# Patient Record
Sex: FEMALE | Race: WHITE | NOT HISPANIC OR LATINO | Employment: FULL TIME | URBAN - METROPOLITAN AREA
[De-identification: names, ages, dates, MRNs, and addresses within clinical notes are randomized per-mention and may not be internally consistent; named-entity substitution may affect disease eponyms.]

---

## 2017-01-03 ENCOUNTER — OFFICE VISIT (OUTPATIENT)
Dept: ORTHOPEDIC SURGERY | Facility: CLINIC | Age: 27
End: 2017-01-03

## 2017-01-03 VITALS — BODY MASS INDEX: 25.1 KG/M2 | WEIGHT: 147 LBS | HEIGHT: 64 IN | TEMPERATURE: 98.2 F

## 2017-01-03 DIAGNOSIS — Z98.890 H/O ARTHROSCOPY OF SHOULDER: Primary | ICD-10-CM

## 2017-01-03 PROCEDURE — 99024 POSTOP FOLLOW-UP VISIT: CPT | Performed by: ORTHOPAEDIC SURGERY

## 2017-01-03 RX ORDER — CYCLOBENZAPRINE HCL 10 MG
10 TABLET ORAL 2 TIMES DAILY PRN
Qty: 30 TABLET | Refills: 0 | Status: SHIPPED | OUTPATIENT
Start: 2017-01-03 | End: 2017-03-08

## 2017-01-03 NOTE — PATIENT INSTRUCTIONS
DENISE Cuellar MD  Capsular Plication for Multidirectional Instability        General Notes:    Please be aware that all timeframes and exercises described below are designed to serve as guidelines only.  While appropriate for the majority of patients, on occasion the therapy and progression to have to be tailored to meet certain individual needs.  Progression through the therapy protocol will be based upon the criteria described rather than adherence to a strict timeframe.  Progress will be agreed upon by the patient, therapist and physician to ensure that we are all in accordance.  Return to sport will be based upon your progress in physical therapy as well as appropriate testing as described below.    Remember to ice 15-20 minutes after each therapy session.  All range of motion and strengthening exercises should be performed as tolerated.  Pain and/or increased swelling are signs that you are doing too much too soon.  If these occur, please decrease your activity level and ice.    Passive means someone will do this for you.  Active assist means you can use your other arm to perform the exercise.  Active means you can use the arm that was operated on to do this exercise.    ===================================================================    Post-operative Phase 1:  Weeks 0-2    Goals:    1.  Ensure wound healing  2.  Prevent shoulder stiffness  3.  Regain early range of motion  4.  Decrease pain    Brace:      Wear sling/pillow at all times except when showering or performing pendulum exercises.  You should remove the sling and perform pendulum exercises 5 or 6 times per day.        Page two    Weight Bearing (WB) status:      No lifting of objects, excessive shoulder motion, excessive stretching or sudden movements.  No supporting body weight with the hands.      Range of Motion (ROM):      NO ACTIVE ROM of shoulder.  Elbow and wrist ROM with shoulder in neutral position at the side only.  Perform  shoulder pendulum exercises 5 to 6 times daily.  Do NOT raise your arm above your head, reach across your body or reach your hand behind you as if to tuck in your shirt or loop your belt.  No internal rotation or horizontal adduction.    Therapy Exercises:      Full finger, wrist and elbow motion.  Shoulder pendulum exercises.  Ball squeeze exercises.     Modalities:      Icing, manual massage, galvanic stimulation for pain control.    Cardio:      Stationary bike.    Criteria for Progression to Phase 2:      Pain controlled.  Wounds healing well.      ===================================================================    Post-operative Phase 2:  Weeks 3-5    Goals:  1.  Protect the repair  2.  Prevent shoulder stiffness  3.  Improve range of motion    Brace:      Continue to use the sling/pillow for the first 6 weeks.  You may remove the sling/pillow for your exercises and showering only.    Weight Bearing (WB) status:      No lifting of objects heavier than a coffee cup, excessive stretching or sudden movements.  No supporting body weight with the hands.      Page three    Range of Motion (ROM):    NO ACTIVE ROM.  Advance passive and active assist forward flexion in scapular plane to 90 degrees.  Advance to 120 degrees FE after week 4.  Supine passive and active assist external rotation to 30 degrees only.  No internal rotation.  No cross body adduction.  Do NOT reach across your body or reach your hand behind you as if to tuck in your shirt or loop your belt.      Therapy Exercises:      As above. NO aggressive passive ROM, stretching or manipulation.  Supine active assist arm elevation to 90 degrees thru week 4 then 120 degrees.  Supine active assist ER to 30 degrees only.  Periscapular isometrics.  Submaximal, subpainful deltoid and rotator cuff isometrics in neutral.  Shoulder shrugs.  Scapular retraction.  Prone rows, prone extensions, and biceps curls after week 5.    Modalities:      Icing, manual massage,  galvanic stimulation for pain control.    Cardio:      Stationary bike.  Treadmill walking, elliptical machine and stair stepper after week 4.    Criteria for Progression to Phase :      Pain controlled.  Wounds healed.  Should have 120 degrees of forward elevation and 30 degrees of external rotation.     Post-operative Phase 3:  Weeks 6-12    Goals:  1.  Protect the repair to prevent overstretching  2.  Improve range of motion  3.  Begin gentle strengthening  4.  Increase functional activities      Brace:      Sling/pillow is no longer necessary unless you are instructed otherwise.  Continue to avoid lifting your arm away from your body, having the arm actively pulled behind you, or across your chest in front of your body, since this puts the repair at risk.      Weight Bearing (WB) status:      No excessive shoulder extension, excessive stretching or sudden movements.  No supporting body weight with the hands.      Page Four    Range of Motion (ROM):      Advance active assist and active forward elevation and external rotation to tolerance.  Can start behind the back internal rotation and horizontal adduction stretching AFTER week 8.      Therapy Exercises:      Continue all exercises listed above.  Advance ROM as described above.  Initiate standing external rotation, seated-standing forward elevation.  Start therabands, prone horizontal arm raises, prone scaption, prone rows, prone extension, standing scaption.  After week 8, may begin wall slides, supine cross chest stretch, side lying external rotation.  UBE forwards and backwards at low resistance after week 8.      Modalities:      Only as needed.  Continue to ice after therapy.    Cardio:      Stationary bike, elliptical machine, stair stepper.  May begin treadmill running progression after week 8.    Criteria for Progression to Phase 4:      Full non-painful range of  motion.    ===================================================================    Post-operative Phase 4:  Weeks 13-18    Goals:  1.  Protect the repair  2.  Continue gentle strengthening  3.  Add functional activities  4.  Improve neuromuscular control    Brace:  None    Weight Bearing (WB) status:      Continue to avoid heavy weight lifting or manual labor.  No forceful pushing or pulling.  NO push ups.  No supporting your body weight with your hands.  Be cautious reaching behind or across your body.          Page five    Range of Motion (ROM):    Continue full motion with light stretching at extremes.    Therapy Exercises:      Continue previous exercises as necessary.  Start sidelying internal rotation (sleeper) stretch and external rotation at 90 degrees of abduction stretching.  Advance strengthening as tolerated with gradual progression from isometrics to bands to light weights.  Start with 1 lb. and progress slowly.  Only perform strengthening exercises 3 x per week to avoid tendonitis.  May gradually begin eccentrically resisted motion, plyometrics and proprioception exercises.    Modalities:      Icing after therapy, as necessary.    Cardio:      Continue running progression program.     Criteria for Progression to Phase 5:      Full painless ROM, strength steadily improving.    ===================================================================    Post-operative Phase 5:  3 months - 6 months    Goals:  1.  Continue to protect repair by avoiding excessively heavy weights or excessive force  2.  Restore full strength  3.  Gradual return to full function and sport    Brace:  None                            Page six    Weight Bearing (WB) status:      Overhead and throwing sports place significant stress on the labrum.  Your therapist and I will determine when you can return to sports.  Returning to sports requires that you have full motion, full strength, and no pain.      You may return to heavier weight  lifting after 3 to 4 months.  You should have full motion and normal strength in your rotator cuff and scapular muscles.  Your therapist and I will test your strength and give you clearance before you start weight training.    I prefer that you start out with high repetitions (15 to 20 reps) of low weights and gradually build up.  You may progress back into weight lifting exercises including shoulder press, bench press, pull downs, and push ups very gradually over a period of 3 to 6 months. You may start a push up progression program at 4 months, beginning with wall push ups.      Typical return to throwing is around 6 months and from the mound at 9 months.     Range of Motion (ROM):    Full    Therapy Exercises:      Continue to advance strengthening, motion, and progress to advanced conditioning and sports related rehab.  May progress weight bearing to include overhead wall dribbles, rebounder throwing, and deceleration drills with weighted ball after week 18.    Modalities:      As needed.    Cardio:      As tolerated.    **NO CONTACT SPORTS UNTIL 9 TO 12 MONTHS POST-OP**

## 2017-01-03 NOTE — MR AVS SNAPSHOT
Violeta Vargas   1/3/2017 10:30 AM   Office Visit    Dept Phone:  [phone]   Encounter #:  [id number]    Provider:  Justice Cuellar MD   Department:  [facility]                Your Full Care Plan              Today's Medication Changes          These changes are accurate as of: 1/3/17 12:18 PM.  If you have any questions, ask your nurse or doctor.               Medication(s)that have changed:     * cyclobenzaprine 10 MG tablet   Commonly known as:  FLEXERIL   Take 1 tablet by mouth 3 (Three) Times a Day As Needed for muscle spasms.   What changed:  Another medication with the same name was added. Make sure you understand how and when to take each.   Changed by:  Justice Cuellar MD       * cyclobenzaprine 10 MG tablet   Commonly known as:  FLEXERIL   Take 1 tablet by mouth 2 (Two) Times a Day As Needed for muscle spasms.   What changed:  You were already taking a medication with the same name, and this prescription was added. Make sure you understand how and when to take each.   Changed by:  Justice Cuellar MD       * Notice:  This list has 2 medication(s) that are the same as other medications prescribed for you. Read the directions carefully, and ask your doctor or other care provider to review them with you.         Where to Get Your Medications      These medications were sent to [address] - [phone] [facility] [phone] [place] [address]     Phone:  [phone]     cyclobenzaprine 10 MG tablet                  Your Updated Medication List          This list is accurate as of: 1/3/17 12:18 PM.  Always use your most recent med list.                ALBUTEROL IN       Cetirizine HCl 10 MG capsule       clobetasol 0.05 % external solution   Commonly known as:  TEMOVATE       clonazePAM 0.5 MG tablet   Commonly known as:   KlonoPIN       * cyclobenzaprine 10 MG tablet   Commonly known as:  FLEXERIL   Take 1 tablet by mouth 3 (Three) Times a Day As Needed for muscle spasms.       * cyclobenzaprine 10 MG tablet   Commonly known as:  FLEXERIL   Take 1 tablet by mouth 2 (Two) Times a Day As Needed for muscle spasms.       fluconazole 150 MG tablet   Commonly known as:  DIFLUCAN   Take 1 tablet by mouth Daily.       HYDROcodone-acetaminophen 5-325 MG per tablet   Commonly known as:  NORCO   Take 1 tablet by mouth Every 4 (Four) Hours As Needed for moderate pain (4-6).       hyoscyamine 0.125 MG tablet   Commonly known as:  ANASPAZ,LEVSIN   With each meal and at bedtime.       JUNEL 1/20 1-20 MG-MCG per tablet   Generic drug:  norethindrone-ethinyl estradiol   Take 1 tablet by mouth Daily.       ketoconazole 2 % shampoo   Commonly known as:  NIZORAL       lactulose 10 GM/15ML solution   Commonly known as:  CHRONULAC   Take 15 mL by mouth 3 (Three) Times a Day.       lithium 300 MG tablet       nitrofurantoin (macrocrystal-monohydrate) 100 MG capsule   Commonly known as:  MACROBID       ondansetron 4 MG tablet   Commonly known as:  ZOFRAN   Take 1 tablet by mouth Every 8 (Eight) Hours As Needed for nausea or vomiting.       senna 8.6 MG tablet   Commonly known as:  SENOKOT   Take 1 tablet by mouth Daily.       vitamin B-12 1000 MCG tablet   Commonly known as:  CYANOCOBALAMIN       * Notice:  This list has 2 medication(s) that are the same as other medications prescribed for you. Read the directions carefully, and ask your doctor or other care provider to review them with you.            We Performed the Following     Ambulatory Referral to Physical Therapy Evaluate and treat       You Were Diagnosed With        Codes Comments    H/O arthroscopy of shoulder    -  Primary ICD-10-CM: Z98.890  ICD-9-CM: V45.89       Instructions          S. Sancho Cuellar MD  Capsular Plication for Multidirectional Instability        General Notes:    Please  be aware that all timeframes and exercises described below are designed to serve as guidelines only.  While appropriate for the majority of patients, on occasion the therapy and progression to have to be tailored to meet certain individual needs.  Progression through the therapy protocol will be based upon the criteria described rather than adherence to a strict timeframe.  Progress will be agreed upon by the patient, therapist and physician to ensure that we are all in accordance.  Return to sport will be based upon your progress in physical therapy as well as appropriate testing as described below.    Remember to ice 15-20 minutes after each therapy session.  All range of motion and strengthening exercises should be performed as tolerated.  Pain and/or increased swelling are signs that you are doing too much too soon.  If these occur, please decrease your activity level and ice.    Passive means someone will do this for you.  Active assist means you can use your other arm to perform the exercise.  Active means you can use the arm that was operated on to do this exercise.    ===================================================================    Post-operative Phase 1:  Weeks 0-2    Goals:    1.  Ensure wound healing  2.  Prevent shoulder stiffness  3.  Regain early range of motion  4.  Decrease pain    Brace:      Wear sling/pillow at all times except when showering or performing pendulum exercises.  You should remove the sling and perform pendulum exercises 5 or 6 times per day.        Page two    Weight Bearing (WB) status:      No lifting of objects, excessive shoulder motion, excessive stretching or sudden movements.  No supporting body weight with the hands.      Range of Motion (ROM):      NO ACTIVE ROM of shoulder.  Elbow and wrist ROM with shoulder in neutral position at the side only.  Perform shoulder pendulum exercises 5 to 6 times daily.  Do NOT raise your arm above your head, reach across your body or  reach your hand behind you as if to tuck in your shirt or loop your belt.  No internal rotation or horizontal adduction.    Therapy Exercises:      Full finger, wrist and elbow motion.  Shoulder pendulum exercises.  Ball squeeze exercises.     Modalities:      Icing, manual massage, galvanic stimulation for pain control.    Cardio:      Stationary bike.    Criteria for Progression to Phase 2:      Pain controlled.  Wounds healing well.      ===================================================================    Post-operative Phase 2:  Weeks 3-5    Goals:  1.  Protect the repair  2.  Prevent shoulder stiffness  3.  Improve range of motion    Brace:      Continue to use the sling/pillow for the first 6 weeks.  You may remove the sling/pillow for your exercises and showering only.    Weight Bearing (WB) status:      No lifting of objects heavier than a coffee cup, excessive stretching or sudden movements.  No supporting body weight with the hands.      Page three    Range of Motion (ROM):    NO ACTIVE ROM.  Advance passive and active assist forward flexion in scapular plane to 90 degrees.  Advance to 120 degrees FE after week 4.  Supine passive and active assist external rotation to 30 degrees only.  No internal rotation.  No cross body adduction.  Do NOT reach across your body or reach your hand behind you as if to tuck in your shirt or loop your belt.      Therapy Exercises:      As above. NO aggressive passive ROM, stretching or manipulation.  Supine active assist arm elevation to 90 degrees thru week 4 then 120 degrees.  Supine active assist ER to 30 degrees only.  Periscapular isometrics.  Submaximal, subpainful deltoid and rotator cuff isometrics in neutral.  Shoulder shrugs.  Scapular retraction.  Prone rows, prone extensions, and biceps curls after week 5.    Modalities:      Icing, manual massage, galvanic stimulation for pain control.    Cardio:      Stationary bike.  Treadmill walking, elliptical machine and  stair stepper after week 4.    Criteria for Progression to Phase :      Pain controlled.  Wounds healed.  Should have 120 degrees of forward elevation and 30 degrees of external rotation.     Post-operative Phase 3:  Weeks 6-12    Goals:  1.  Protect the repair to prevent overstretching  2.  Improve range of motion  3.  Begin gentle strengthening  4.  Increase functional activities      Brace:      Sling/pillow is no longer necessary unless you are instructed otherwise.  Continue to avoid lifting your arm away from your body, having the arm actively pulled behind you, or across your chest in front of your body, since this puts the repair at risk.      Weight Bearing (WB) status:      No excessive shoulder extension, excessive stretching or sudden movements.  No supporting body weight with the hands.      Page Four    Range of Motion (ROM):      Advance active assist and active forward elevation and external rotation to tolerance.  Can start behind the back internal rotation and horizontal adduction stretching AFTER week 8.      Therapy Exercises:      Continue all exercises listed above.  Advance ROM as described above.  Initiate standing external rotation, seated-standing forward elevation.  Start therabands, prone horizontal arm raises, prone scaption, prone rows, prone extension, standing scaption.  After week 8, may begin wall slides, supine cross chest stretch, side lying external rotation.  UBE forwards and backwards at low resistance after week 8.      Modalities:      Only as needed.  Continue to ice after therapy.    Cardio:      Stationary bike, elliptical machine, stair stepper.  May begin treadmill running progression after week 8.    Criteria for Progression to Phase 4:      Full non-painful range of motion.    ===================================================================    Post-operative Phase 4:  Weeks 13-18    Goals:  1.  Protect the repair  2.  Continue gentle strengthening  3.  Add  functional activities  4.  Improve neuromuscular control    Brace:  None    Weight Bearing (WB) status:      Continue to avoid heavy weight lifting or manual labor.  No forceful pushing or pulling.  NO push ups.  No supporting your body weight with your hands.  Be cautious reaching behind or across your body.          Page five    Range of Motion (ROM):    Continue full motion with light stretching at extremes.    Therapy Exercises:      Continue previous exercises as necessary.  Start sidelying internal rotation (sleeper) stretch and external rotation at 90 degrees of abduction stretching.  Advance strengthening as tolerated with gradual progression from isometrics to bands to light weights.  Start with 1 lb. and progress slowly.  Only perform strengthening exercises 3 x per week to avoid tendonitis.  May gradually begin eccentrically resisted motion, plyometrics and proprioception exercises.    Modalities:      Icing after therapy, as necessary.    Cardio:      Continue running progression program.     Criteria for Progression to Phase 5:      Full painless ROM, strength steadily improving.    ===================================================================    Post-operative Phase 5:  3 months - 6 months    Goals:  1.  Continue to protect repair by avoiding excessively heavy weights or excessive force  2.  Restore full strength  3.  Gradual return to full function and sport    Brace:  None                            Page six    Weight Bearing (WB) status:      Overhead and throwing sports place significant stress on the labrum.  Your therapist and I will determine when you can return to sports.  Returning to sports requires that you have full motion, full strength, and no pain.      You may return to heavier weight lifting after 3 to 4 months.  You should have full motion and normal strength in your rotator cuff and scapular muscles.  Your therapist and I will test your strength and give you clearance before  you start weight training.    I prefer that you start out with high repetitions (15 to 20 reps) of low weights and gradually build up.  You may progress back into weight lifting exercises including shoulder press, bench press, pull downs, and push ups very gradually over a period of 3 to 6 months. You may start a push up progression program at 4 months, beginning with wall push ups.      Typical return to throwing is around 6 months and from the mound at 9 months.     Range of Motion (ROM):    Full    Therapy Exercises:      Continue to advance strengthening, motion, and progress to advanced conditioning and sports related rehab.  May progress weight bearing to include overhead wall dribbles, rebounder throwing, and deceleration drills with weighted ball after week 18.    Modalities:      As needed.    Cardio:      As tolerated.    **NO CONTACT SPORTS UNTIL 9 TO 12 MONTHS POST-OP**     Patient Instructions History      Upcoming Appointments     Visit Type Date Time Department    POST-OP 1/3/2017 10:30 AM [facility]    FOLLOW UP 1/27/2017  3:45 PM [facility]    OFFICE VISIT 3/8/2017 10:00 AM [facility]      SearchMe Signup     Our records indicate that you have an active [employer] account.    You can view your After Visit Summary by going to FindThatCourse and logging in with your SearchMe username and password.  If you don't have a SearchMe username and password but a parent or guardian has access to your record, the parent or guardian should login with their own SearchMe username and password and access your record to view the After Visit Summary.    If you have questions, you can email [email or web address] or call [phone] to talk to our SearchMe staff.  Remember, SearchMe is NOT to be used for urgent needs.  For medical emergencies, dial 911.               Other Info from Your Visit           Your Appointments     Jan 27, 2017  3:45 PM EST   Follow Up with Justice COBOS  "MD Alisa   [facility] (--)    [address]  [place] [address] [phone]           Arrive 15 minutes prior to appointment.            Mar 08, 2017 10:00 AM EST   Office Visit with Alexsander Pierson Jr., MD   [facility] (--)    [address]  [place] [address] [phone]           Arrive 15 minutes prior to appointment.              Allergies     Lamotrigine        Reason for Visit     Left Shoulder - Post-op Follow-up           Vital Signs     Temperature Height Weight Body Mass Index Smoking Status       98.2 °F (36.8 °C) 64\" (162.6 cm) 147 lb (66.7 kg) 25.23 kg/m2 Never Smoker       Problems and Diagnoses Noted     History of arthroscopy of shoulder    -  Primary        "

## 2017-01-03 NOTE — PROGRESS NOTES
Violeta Vargas : 1990 MRN: [id number] DATE: 1/3/2017    CC: 1 week s/p left shoulder capsular plication    HPI: Pt. returns to clinic today stating pain has been gradually improving.  Denies any wound problems including redness, drainage.  No fevers, chills, sweats.  Has been doing the pendulum exercises as instructed.  Reports compliance with use of the sling.      Vitals:    17 1135   Temp: 98.2 °F (36.8 °C)         Current Outpatient Prescriptions:   •  ALBUTEROL IN, Inhale 2 puffs every 1 (one) hour as needed., Disp: , Rfl:   •  Cetirizine HCl 10 MG capsule, Take 10 mg by mouth daily., Disp: , Rfl:   •  clobetasol (TEMOVATE) 0.05 % external solution, , Disp: , Rfl:   •  clonazePAM (KlonoPIN) 0.5 MG tablet, Take 0.5 mg by mouth 2 (two) times a day as needed for seizures., Disp: , Rfl:   •  cyclobenzaprine (FLEXERIL) 10 MG tablet, Take 1 tablet by mouth 3 (Three) Times a Day As Needed for muscle spasms., Disp: 90 tablet, Rfl: 0  •  HYDROcodone-acetaminophen (NORCO) 5-325 MG per tablet, Take 1 tablet by mouth Every 4 (Four) Hours As Needed for moderate pain (4-6)., Disp: 50 tablet, Rfl: 0  •  hyoscyamine (ANASPAZ,LEVSIN) 0.125 MG tablet, With each meal and at bedtime., Disp: 120 tablet, Rfl: 11  •  JUNEL  1-20 MG-MCG per tablet, Take 1 tablet by mouth Daily., Disp: 21 tablet, Rfl: 12  •  ketoconazole (NIZORAL) 2 % shampoo, , Disp: , Rfl:   •  lactulose (CHRONULAC) 10 GM/15ML solution, Take 15 mL by mouth 3 (Three) Times a Day., Disp: 473 mL, Rfl: 5  •  nitrofurantoin, macrocrystal-monohydrate, (MACROBID) 100 MG capsule, , Disp: , Rfl:   •  ondansetron (ZOFRAN) 4 MG tablet, Take 1 tablet by mouth Every 8 (Eight) Hours As Needed for nausea or vomiting., Disp: 30 tablet, Rfl: 0  •  senna (SENOKOT) 8.6 MG tablet, Take 1 tablet by mouth Daily., Disp: 30 tablet, Rfl: 11  •  vitamin B-12 (CYANOCOBALAMIN) 1000 MCG tablet, Take 1,000 mcg by mouth daily., Disp: , Rfl:   •  fluconazole (DIFLUCAN) 150 MG  tablet, Take 1 tablet by mouth Daily., Disp: 1 tablet, Rfl: 12  •  lithium 300 MG tablet, Take 1 tablet by mouth 2 (Two) Times a Day., Disp: , Rfl:     Past Medical History   Diagnosis Date   • Anxiety    • Arthritis    • Bipolar 1 disorder    • Depression    • Injury of back        Past Surgical History   Procedure Laterality Date   • Colonoscopy     • Dental procedure         Family History   Problem Relation Age of Onset   • Adopted: Yes   • No Known Problems Mother    • No Known Problems Father    • No Known Problems Sister    • No Known Problems Brother    • No Known Problems Son    • No Known Problems Daughter    • No Known Problems Maternal Grandmother    • No Known Problems Maternal Grandfather    • No Known Problems Paternal Grandmother    • No Known Problems Paternal Grandfather    • No Known Problems Cousin        Social History     Social History   • Marital status:      Spouse name: N/A   • Number of children: N/A   • Years of education: N/A     Occupational History   • Not on file.     Social History Main Topics   • Smoking status: Never Smoker   • Smokeless tobacco: Never Used   • Alcohol use No   • Drug use: No   • Sexual activity: Not on file     Other Topics Concern   • Not on file     Social History Narrative           Exam:   Wounds appear well-approximated without any erythema or drainage.  Arm and forearm soft.  Shoulder moves fluidly with pendulums.  Good motor and sensory function distally.  Palpable pulses with good cap refill.      Imaging   none    Impression:  1 week s/p left shoulder capsular plication    Plan:    1.  Sutures removed and replaced with steri-strips.  2.  Will begin PT per protocol--Rx given along with 2 copies of the appropriate rehab protocol.  3.  F/u in 3 weeks at which time we'll discontinue the sling and progress to full motion.  4.  Counseled the patient about appropriate activity modifications and restrictions, including no driving at this time.

## 2017-01-09 RX ORDER — HYDROCODONE BITARTRATE AND ACETAMINOPHEN 5; 325 MG/1; MG/1
1 TABLET ORAL EVERY 4 HOURS PRN
Qty: 50 TABLET | Refills: 0 | Status: SHIPPED | OUTPATIENT
Start: 2017-01-09 | End: 2017-03-08

## 2017-01-27 ENCOUNTER — OFFICE VISIT (OUTPATIENT)
Dept: ORTHOPEDIC SURGERY | Facility: CLINIC | Age: 27
End: 2017-01-27

## 2017-01-27 ENCOUNTER — TELEPHONE (OUTPATIENT)
Dept: INTERNAL MEDICINE | Facility: CLINIC | Age: 27
End: 2017-01-27

## 2017-01-27 DIAGNOSIS — K58.9 IRRITABLE BOWEL SYNDROME, UNSPECIFIED TYPE: ICD-10-CM

## 2017-01-27 DIAGNOSIS — Z09 SURGERY FOLLOW-UP: Primary | ICD-10-CM

## 2017-01-27 PROCEDURE — 99024 POSTOP FOLLOW-UP VISIT: CPT | Performed by: ORTHOPAEDIC SURGERY

## 2017-01-27 RX ORDER — HYOSCYAMINE SULFATE 0.125 MG
TABLET ORAL
Qty: 120 TABLET | Refills: 11 | Status: SHIPPED | OUTPATIENT
Start: 2017-01-27 | End: 2017-05-12

## 2017-01-27 NOTE — TELEPHONE ENCOUNTER
----- Message from Modesta Lyons MA sent at 1/27/2017 12:28 PM EST -----  Pt requests refill through pharmacy    Hyoscyamine    [employer]

## 2017-01-27 NOTE — MR AVS SNAPSHOT
Violeta Vargas   1/27/2017 3:45 PM   Office Visit    Dept Phone:  [phone]   Encounter #:  [id number]    Provider:  Justice Cuellar MD   Department:  [facility]                Your Full Care Plan              Where to Get Your Medications      These medications were sent to [facility] - [place], [place] - [address] - [phone] [facility] [phone]   [address], [facility] [address]     Phone:  [phone]     hyoscyamine 0.125 MG tablet            Your Updated Medication List          This list is accurate as of: 1/27/17  4:50 PM.  Always use your most recent med list.                ALBUTEROL IN       Cetirizine HCl 10 MG capsule       clobetasol 0.05 % external solution   Commonly known as:  TEMOVATE       clonazePAM 0.5 MG tablet   Commonly known as:  KlonoPIN       * cyclobenzaprine 10 MG tablet   Commonly known as:  FLEXERIL   Take 1 tablet by mouth 3 (Three) Times a Day As Needed for muscle spasms.       * cyclobenzaprine 10 MG tablet   Commonly known as:  FLEXERIL   Take 1 tablet by mouth 2 (Two) Times a Day As Needed for muscle spasms.       fluconazole 150 MG tablet   Commonly known as:  DIFLUCAN   Take 1 tablet by mouth Daily.       HYDROcodone-acetaminophen 5-325 MG per tablet   Commonly known as:  NORCO   Take 1 tablet by mouth Every 4 (Four) Hours As Needed for moderate pain (4-6).       hyoscyamine 0.125 MG tablet   Commonly known as:  ANASPAZ,LEVSIN   With each meal and at bedtime.       JUNEL 1/20 1-20 MG-MCG per tablet   Generic drug:  norethindrone-ethinyl estradiol   Take 1 tablet by mouth Daily.       ketoconazole 2 % shampoo   Commonly known as:  NIZORAL       lactulose 10 GM/15ML solution   Commonly known as:  CHRONULAC   Take 15 mL by mouth 3 (Three) Times a Day.       lithium 300 MG tablet       nitrofurantoin (macrocrystal-monohydrate) 100 MG capsule   Commonly known as:   MACROBID       ondansetron 4 MG tablet   Commonly known as:  ZOFRAN   Take 1 tablet by mouth Every 8 (Eight) Hours As Needed for nausea or vomiting.       senna 8.6 MG tablet   Commonly known as:  SENOKOT   Take 1 tablet by mouth Daily.       vitamin B-12 1000 MCG tablet   Commonly known as:  CYANOCOBALAMIN       * Notice:  This list has 2 medication(s) that are the same as other medications prescribed for you. Read the directions carefully, and ask your doctor or other care provider to review them with you.            Instructions     None    Patient Instructions History      Upcoming Appointments     Visit Type Date Time Department    FOLLOW UP 1/27/2017  3:45 PM [facility]    OFFICE VIS[facility] 3[facility]/2017 10:00 AM [facility]    FOLLOW UP 3/10/2017  4:00 PM [facility]      [email or web address] Signup     Our records indicate that you have an active [employer] account.    You can view your After Visit Summary by going to [employer] and logging in with your [employer] username and password.  If you don't have a [employer] username and password but a parent or guardian has access to your record, the parent or guardian should login with their own [employer] username and password and access your record to view the After Visit Summary.    If you have questions, you can email [email or web address] or call [phone] to talk to our [employer] staff.  Remember, [employer] is NOT to be used for urgent needs.  For medical emergencies, dial 911.               Other Info from Your Visit           Your Appointments     Mar 08, 2017 10:00 AM EST   Office Visit with Alexsander Pierson Jr., MD   [facility] (--)    Jaylene Howell [facility]. [address] [place] [id number] [phone]           Arrive 15 minutes prior to appointment.            Mar 10, 2017  4:00 PM EST   Follow Up with Justice Cuellar MD   [facility] (--)    Sheyla Howell  [place] [address]   [phone]           Arrive 15 minutes prior to appointment.              Allergies     Lamotrigine        Reason for Visit     Left Shoulder - Pain, Follow-up           Vital Signs     Smoking Status                   Never Smoker

## 2017-01-29 NOTE — PROGRESS NOTES
Violeta comes in today for her one-month follow-up.  She tells me the shoulder is steadily getting better.  She has been compliant with the therapy and use of the sling.    On exam, her incisions are healed.  Motion is ahead of where I would want her to be.  Forward elevation is to almost 170, external rotation to at least 65.  Shoulder moves fluidly and without any mechanical phenomenon.  Both passive and active motion are well-tolerated.    Assessment is one-month status post left shoulder capsular plication    Plan was discussed with the patient.  I am concerned that her motion is ahead of where I would want her to be.  She is stretching out the capsular plication far sooner than what I would have hoped.  I want her to continue working on the therapy.  I want to slow her down a bit.  I recommended 2 more weeks in the sling.  After that, she can come out of the sling and start to work on progressive motion and even some early strengthening.  I will see her back in 1 month for a recheck.    Justice Cuellar MD

## 2017-03-08 ENCOUNTER — OFFICE VISIT (OUTPATIENT)
Dept: INTERNAL MEDICINE | Facility: CLINIC | Age: 27
End: 2017-03-08

## 2017-03-08 VITALS
DIASTOLIC BLOOD PRESSURE: 60 MMHG | WEIGHT: 136.4 LBS | HEIGHT: 64 IN | HEART RATE: 84 BPM | SYSTOLIC BLOOD PRESSURE: 80 MMHG | BODY MASS INDEX: 23.29 KG/M2

## 2017-03-08 DIAGNOSIS — F32.9 MAJOR DEPRESSION, CHRONIC: ICD-10-CM

## 2017-03-08 DIAGNOSIS — J45.30 MILD PERSISTENT ASTHMA WITHOUT COMPLICATION: Primary | ICD-10-CM

## 2017-03-08 DIAGNOSIS — F51.04 CHRONIC INSOMNIA: ICD-10-CM

## 2017-03-08 DIAGNOSIS — G43.109 MIGRAINE WITH AURA AND WITHOUT STATUS MIGRAINOSUS, NOT INTRACTABLE: ICD-10-CM

## 2017-03-08 DIAGNOSIS — K58.0 IRRITABLE BOWEL SYNDROME WITH DIARRHEA: ICD-10-CM

## 2017-03-08 DIAGNOSIS — F31.60 BIPOLAR AFFECTIVE DISORDER, CURRENT EPISODE MIXED, CURRENT EPISODE SEVERITY UNSPECIFIED (HCC): ICD-10-CM

## 2017-03-08 LAB
LITHIUM SERPL-SCNC: 1.1 MMOL/L (ref 0.6–1.2)
TSH SERPL DL<=0.05 MIU/L-ACNC: 1 MIU/ML (ref 0.4–4.2)

## 2017-03-08 PROCEDURE — 84443 ASSAY THYROID STIM HORMONE: CPT | Performed by: INTERNAL MEDICINE

## 2017-03-08 PROCEDURE — 99214 OFFICE O/P EST MOD 30 MIN: CPT | Performed by: INTERNAL MEDICINE

## 2017-03-08 RX ORDER — TEMAZEPAM 15 MG/1
15 CAPSULE ORAL NIGHTLY PRN
Qty: 30 CAPSULE | Refills: 5 | OUTPATIENT
Start: 2017-03-08 | End: 2017-03-28 | Stop reason: ALTCHOICE

## 2017-03-08 RX ORDER — ONDANSETRON 4 MG/1
4 TABLET, FILM COATED ORAL EVERY 8 HOURS PRN
Qty: 30 TABLET | Refills: 5 | Status: SHIPPED | OUTPATIENT
Start: 2017-03-08 | End: 2017-06-06 | Stop reason: SDUPTHER

## 2017-03-08 NOTE — PROGRESS NOTES
Subjective   Violeta Vargas is a 26 y.o. female.     History of Present Illness she is here today for follow-up of chronic anxiety as well as chronic insomnia and irritable bowel and headache.  Through her psychiatry she is back on lithium at 900 mg per day and has been so for 2 weeks.  She feels much better from a psychological standpoint.  She underwent left shoulder labrum surgery in December and has much diminished pain there.  She is still undergoing physical therapy.    She relates problems with insomnia dating back 10 years.  Over-the-counter preparations as well as Atarax were either unhelpful or closed next a sedation.  Her migraine headaches have been somewhat less frequent perhaps every one or 2 weeks.  She uses Aleve with some relief.  She has had some cough without sputum production and clear rhinorrhea over the last one or 2 weeks.  She denies any fever sweats or chills.  She has not had any wheezing or dyspnea.  She rarely uses albuterol inhaler.        Review of Systems   Constitutional: Negative for activity change, appetite change, fatigue, fever and unexpected weight change.   Respiratory: Positive for cough. Negative for chest tightness, shortness of breath and wheezing.    Cardiovascular: Negative for chest pain, palpitations and leg swelling.   Gastrointestinal: Negative for abdominal pain, anal bleeding, blood in stool, diarrhea, nausea and vomiting.   Genitourinary: Negative for dysuria, flank pain and hematuria.   Neurological: Positive for headaches. Negative for dizziness, syncope, facial asymmetry, speech difficulty, weakness and numbness.   Psychiatric/Behavioral: Negative for confusion and dysphoric mood. The patient is not nervous/anxious.        Objective   Physical Exam   Constitutional: She is oriented to person, place, and time. She appears well-developed and well-nourished. She is active.   Eyes: Conjunctivae are normal.   Fundoscopic exam:       The right eye shows no AV nicking,  no exudate and no hemorrhage.        The left eye shows no AV nicking, no exudate and no hemorrhage.   Neck: Carotid bruit is not present. No thyroid mass and no thyromegaly present.   Cardiovascular: Normal rate, regular rhythm, S1 normal and S2 normal.  Exam reveals no S3 and no S4.    No murmur heard.  Pulses:       Posterior tibial pulses are 1+ on the right side, and 1+ on the left side.   Pulmonary/Chest: No tachypnea. No respiratory distress. She has no decreased breath sounds. She has no wheezes. She has no rhonchi. She has no rales.   Abdominal: Soft. Normal appearance and bowel sounds are normal. She exhibits no abdominal bruit and no mass. There is no hepatosplenomegaly. There is no tenderness.       Vascular Status -  Her exam exhibits no right foot edema. Her exam exhibits no left foot edema.  Neurological: She is alert and oriented to person, place, and time. Gait normal.   Reflex Scores:       Bicep reflexes are 2+ on the right side.  Psychiatric: Her speech is normal and behavior is normal. Judgment and thought content normal. Cognition and memory are normal.       Assessment/Plan  abdominal ultrasound was normal.  CBC and BMP in November 2016 were normal    Assessment #1 chronic insomnia #2 bipolar illness-she appears somewhat manicky today but certainly less depressed than in prior visits.  #3 migraine headache-fortunately less frequent and somewhat responsive to Aleve    Plan #1 temazepam 15 mg by mouth daily at bedtime when necessary sleep #2 TSH and lithium level today.  Routine follow-up with me in 6 months.

## 2017-03-10 ENCOUNTER — OFFICE VISIT (OUTPATIENT)
Dept: ORTHOPEDIC SURGERY | Facility: CLINIC | Age: 27
End: 2017-03-10

## 2017-03-10 VITALS — TEMPERATURE: 98.2 F | WEIGHT: 147 LBS | BODY MASS INDEX: 25.1 KG/M2 | HEIGHT: 64 IN

## 2017-03-10 DIAGNOSIS — Z09 SURGERY FOLLOW-UP: Primary | ICD-10-CM

## 2017-03-10 PROCEDURE — 99024 POSTOP FOLLOW-UP VISIT: CPT | Performed by: ORTHOPAEDIC SURGERY

## 2017-03-12 NOTE — PROGRESS NOTES
Violeta comes in today for follow-up.  She is now approximately 2-1/2 months out from her labral repair/capsular plication.  She says the shoulder is doing great.  She has no pain.  All of her preoperative symptoms are resolved.  She is still in therapy at this point.  She says the shoulder is still a little stiff but it does not limit her.    Her portals are healed.  Motion is excellent.  She is still a little tight in external rotation as expected.  The shoulder moves fluidly and without pain.    Assessment: 2.5 months status post left shoulder capsular plication    Plan: She seems to be doing well.  I will see her back in 6 weeks for what I expect to be a final follow-up visit.

## 2017-03-30 ENCOUNTER — OFFICE VISIT (OUTPATIENT)
Dept: OBSTETRICS AND GYNECOLOGY | Facility: CLINIC | Age: 27
End: 2017-03-30

## 2017-03-30 VITALS — DIASTOLIC BLOOD PRESSURE: 60 MMHG | SYSTOLIC BLOOD PRESSURE: 122 MMHG

## 2017-03-30 DIAGNOSIS — Z30.09 STERILIZATION CONSULT: Primary | ICD-10-CM

## 2017-03-30 PROCEDURE — 99212 OFFICE O/P EST SF 10 MIN: CPT | Performed by: OBSTETRICS & GYNECOLOGY

## 2017-03-30 NOTE — PROGRESS NOTES
Subjective   Violeta Vargas is a 26 y.o. female is here today as a self referral for tubal sterilization discussion.    History of Present Illness-patient would like to proceed with laparoscopic sterilization.  She is on numerous medications that would pose a risk to her developing fetus.  She feels that the best course of action would be to proceed with a sterilization procedure.    The following portions of the patient's history were reviewed and updated as appropriate: allergies, current medications, past family history, past medical history, past social history, past surgical history and problem list.   Allergies   Allergen Reactions   • Lamotrigine Rash     Past Medical History:   Diagnosis Date   • Anxiety    • Arthritis    • Bipolar 1 disorder    • Depression    • Injury of back      Past Surgical History:   Procedure Laterality Date   • COLONOSCOPY     • DENTAL PROCEDURE       Current Outpatient Prescriptions on File Prior to Visit   Medication Sig Dispense Refill   • ALBUTEROL IN Inhale 2 puffs every 1 (one) hour as needed.     • Cetirizine HCl 10 MG capsule Take 10 mg by mouth daily.     • clobetasol (TEMOVATE) 0.05 % external solution      • clonazePAM (KlonoPIN) 0.5 MG tablet Take 0.5 mg by mouth 2 (two) times a day as needed for seizures.     • cyclobenzaprine (FLEXERIL) 10 MG tablet Take 1 tablet by mouth 3 (Three) Times a Day As Needed for muscle spasms. 90 tablet 0   • hyoscyamine (ANASPAZ,LEVSIN) 0.125 MG tablet With each meal and at bedtime. 120 tablet 11   • JUNEL 1/20 1-20 MG-MCG per tablet Take 1 tablet by mouth Daily. 21 tablet 12   • ketoconazole (NIZORAL) 2 % shampoo      • lactulose (CHRONULAC) 10 GM/15ML solution Take 15 mL by mouth 3 (Three) Times a Day. 473 mL 5   • lithium 300 MG tablet Take 3 tablets by mouth Daily.     • ondansetron (ZOFRAN) 4 MG tablet Take 1 tablet by mouth Every 8 (Eight) Hours As Needed for Nausea or Vomiting. 30 tablet 5   • senna (SENOKOT) 8.6 MG tablet Take 1  tablet by mouth Daily. 30 tablet 11   • Suvorexant (BELSOMRA) 20 MG tablet Take 1 tablet by mouth every night at bedtime.     • vitamin B-12 (CYANOCOBALAMIN) 1000 MCG tablet Take 1,000 mcg by mouth daily.       No current facility-administered medications on file prior to visit.      Family History   Problem Relation Age of Onset   • Adopted: Yes   • No Known Problems Mother    • No Known Problems Father    • No Known Problems Sister    • No Known Problems Brother    • No Known Problems Son    • No Known Problems Daughter    • No Known Problems Maternal Grandmother    • No Known Problems Maternal Grandfather    • No Known Problems Paternal Grandmother    • No Known Problems Paternal Grandfather    • No Known Problems Cousin          Review of Systems   Constitutional: Negative.    HENT: Negative.    Eyes: Negative.    Respiratory: Negative.    Cardiovascular: Negative.    Gastrointestinal: Negative.    Endocrine: Negative.    Genitourinary: Negative.    Musculoskeletal: Negative.    Skin: Negative.    Allergic/Immunologic: Negative.    Neurological: Negative.    Hematological: Negative.    Psychiatric/Behavioral: Negative.        Objective   Physical Exam   Constitutional: She is oriented to person, place, and time. She appears well-developed and well-nourished.   HENT:   Head: Normocephalic and atraumatic.   Eyes: Pupils are equal, round, and reactive to light.   Pulmonary/Chest: Effort normal.   Neurological: She is alert and oriented to person, place, and time. She has normal reflexes.   Psychiatric: She has a normal mood and affect. Her behavior is normal. Judgment and thought content normal.   Nursing note and vitals reviewed.        Assessment/Plan   Problems Addressed this Visit     None      Visit Diagnoses     Sterilization consult    -  Primary    Relevant Orders    Case Request        Patient would like to proceed with a laparoscopic tubal cautery.  She is aware of the failure of 1 in 200-1 and 500 as  well as the expectations, risks, technique and recovery.

## 2017-04-21 ENCOUNTER — OFFICE VISIT (OUTPATIENT)
Dept: ORTHOPEDIC SURGERY | Facility: CLINIC | Age: 27
End: 2017-04-21

## 2017-04-21 DIAGNOSIS — Z09 SURGERY FOLLOW-UP: Primary | ICD-10-CM

## 2017-04-21 PROCEDURE — 99212 OFFICE O/P EST SF 10 MIN: CPT | Performed by: ORTHOPAEDIC SURGERY

## 2017-04-21 RX ORDER — FEXOFENADINE HCL AND PSEUDOEPHEDRINE HCI 180; 240 MG/1; MG/1
1 TABLET, EXTENDED RELEASE ORAL DAILY
COMMUNITY
End: 2017-04-28

## 2017-04-21 NOTE — PROGRESS NOTES
Chief Complaint:  4 months s/p left shoulder capsular plication    HPI:  Violeta comes in today for follow-up.  She quit therapy because her  lost her job and she lost her insurance.  Since she quit therapy she has been experiencing occasional muscle spasms in the back of her scapula.  She has an appointment to try therapeutic massage.  Her preoperative symptoms are resolved.  She tells me that she is happy with the outcome from surgery.    Exam:  Her portals are healed.  Her motion is full.  No evident instability on exam.  No winging of the scapula.  Mild tenderness along the superomedial border of the scapula.  No crepitus.    Imaging:  None    Assessment:  4 months s/p left shoulder capsular plication    Plan:  I've encouraged her to try massage.  I offered her a muscle relaxer which she declined.  I will release her to follow-up as needed.    Justice Cuellar MD  04/21/2017

## 2017-04-28 ENCOUNTER — OFFICE VISIT (OUTPATIENT)
Dept: FAMILY MEDICINE CLINIC | Facility: CLINIC | Age: 27
End: 2017-04-28

## 2017-04-28 VITALS
OXYGEN SATURATION: 99 % | SYSTOLIC BLOOD PRESSURE: 80 MMHG | BODY MASS INDEX: 23.42 KG/M2 | HEART RATE: 63 BPM | HEIGHT: 64 IN | TEMPERATURE: 97.9 F | DIASTOLIC BLOOD PRESSURE: 60 MMHG | WEIGHT: 137.2 LBS

## 2017-04-28 DIAGNOSIS — M25.50 POLYARTHRALGIA: ICD-10-CM

## 2017-04-28 DIAGNOSIS — G44.229 CHRONIC TENSION-TYPE HEADACHE, NOT INTRACTABLE: ICD-10-CM

## 2017-04-28 DIAGNOSIS — F31.12 BIPOLAR AFFECTIVE DISORDER, CURRENTLY MANIC, MODERATE (HCC): ICD-10-CM

## 2017-04-28 DIAGNOSIS — Z79.899 HIGH RISK MEDICATION USE: ICD-10-CM

## 2017-04-28 DIAGNOSIS — F41.9 ANXIETY: ICD-10-CM

## 2017-04-28 DIAGNOSIS — K58.2 IRRITABLE BOWEL SYNDROME WITH BOTH CONSTIPATION AND DIARRHEA: Primary | ICD-10-CM

## 2017-04-28 DIAGNOSIS — J30.1 SEASONAL ALLERGIC RHINITIS DUE TO POLLEN: ICD-10-CM

## 2017-04-28 PROBLEM — J30.2 SEASONAL ALLERGIC RHINITIS: Status: ACTIVE | Noted: 2017-04-28

## 2017-04-28 PROCEDURE — 99214 OFFICE O/P EST MOD 30 MIN: CPT | Performed by: NURSE PRACTITIONER

## 2017-04-28 RX ORDER — FLUTICASONE PROPIONATE 50 MCG
2 SPRAY, SUSPENSION (ML) NASAL DAILY
Qty: 1 EACH | Refills: 5 | Status: SHIPPED | OUTPATIENT
Start: 2017-04-28 | End: 2017-05-28

## 2017-04-28 RX ORDER — FEXOFENADINE HCL 180 MG/1
180 TABLET ORAL DAILY
Qty: 30 TABLET | Refills: 5 | Status: SHIPPED | OUTPATIENT
Start: 2017-04-28 | End: 2017-06-06

## 2017-04-28 NOTE — PROGRESS NOTES
Subjective   Violeta Vargas is a 26 y.o. female.     History of Present Illness   New pt transferring care.  Bipolar disorder/anxiety/chronic insomnia:  Needs new psych as her insurance has changed to medicaid.  Doesn't want to go to [facility].  She plans to check with her insurance for approved providers.  dx'd as a teen.  On lithium for several years.  Level was high last time checked about a month ago.  She decreased her dose herself as she was not able to see psych.  IBS-needs GI referral.  C/o multiple joint aches for years.  Never eval for RA.      The following portions of the patient's history were reviewed and updated as appropriate: allergies, current medications, past family history, past medical history, past social history, past surgical history and problem list.    Review of Systems   HENT: Positive for congestion.    Gastrointestinal: Positive for abdominal pain, constipation, diarrhea and nausea.   Psychiatric/Behavioral: Positive for sleep disturbance. The patient is nervous/anxious.    All other systems reviewed and are negative.      Objective   Physical Exam   Constitutional: Vital signs are normal. She appears well-developed and well-nourished.   HENT:   Head: Normocephalic and atraumatic.   Right Ear: Hearing, tympanic membrane, external ear and ear canal normal.   Left Ear: Hearing, external ear and ear canal normal. Tympanic membrane is retracted.   Nose: Mucosal edema present.   Mouth/Throat: Uvula is midline and oropharynx is clear and moist.   Cardiovascular: Normal rate, regular rhythm, S1 normal, S2 normal and normal heart sounds.    No murmur heard.  Pulmonary/Chest: Effort normal and breath sounds normal.   Lymphadenopathy:     She has no cervical adenopathy.   Neurological: She is alert.   Psychiatric: Her mood appears anxious. Her speech is rapid and/or pressured.   Nursing note and vitals reviewed.      Assessment/Plan   Violeta was seen today for establish care.    Diagnoses and  all orders for this visit:    Irritable bowel syndrome with both constipation and diarrhea  -     Ambulatory Referral to Gastroenterology  -     [facility] Level  -     Rheumatoid Factor, Quant  -     CBC & Differential  -     TSH    Anxiety  -     Lithium Level  -     Rheumatoid Factor, Quant  -     CBC & Differential  -     TSH    Polyarthralgia  -     Lithium Level  -     Rheumatoid Factor, Quant  -     CBC & Differential  -     TSH    Bipolar affective disorder, currently manic, moderate  -     Lithium Level  -     Rheumatoid Factor, Quant  -     CBC & Differential  -     TSH    High risk medication use  -     Lithium Level  -     Rheumatoid Factor, Quant  -     CBC & Differential  -     TSH    Chronic tension-type headache, not intractable    Seasonal allergic rhinitis due to pollen    Other orders  -     fluticasone (FLONASE) 50 MCG/ACT nasal spray; 2 sprays into each nostril Daily for 30 days.  -     fexofenadine (ALLEGRA ALLERGY) 180 MG tablet; Take 1 tablet by mouth Daily.      Bipolar disorder    She needs to establish care with psychiatry.  I will give her a refill of lithium if she needs it.  Await lab results.  RTC 6 months.

## 2017-04-29 LAB
BASOPHILS # BLD AUTO: 0.01 10*3/MM3 (ref 0–0.2)
BASOPHILS NFR BLD AUTO: 0.2 % (ref 0–1.5)
EOSINOPHIL # BLD AUTO: 0.16 10*3/MM3 (ref 0–0.7)
EOSINOPHIL NFR BLD AUTO: 2.4 % (ref 0.3–6.2)
ERYTHROCYTE [DISTWIDTH] IN BLOOD BY AUTOMATED COUNT: 13.2 % (ref 11.7–13)
HCT VFR BLD AUTO: 41.7 % (ref 35.6–45.5)
HGB BLD-MCNC: 13.3 G/DL (ref 11.9–15.5)
IMM GRANULOCYTES # BLD: 0 10*3/MM3 (ref 0–0.03)
IMM GRANULOCYTES NFR BLD: 0 % (ref 0–0.5)
LITHIUM SERPL-SCNC: 0.6 MMOL/L (ref 0.6–1.2)
LYMPHOCYTES # BLD AUTO: 2.77 10*3/MM3 (ref 0.9–4.8)
LYMPHOCYTES NFR BLD AUTO: 42.4 % (ref 19.6–45.3)
MCH RBC QN AUTO: 28.6 PG (ref 26.9–32)
MCHC RBC AUTO-ENTMCNC: 31.9 G/DL (ref 32.4–36.3)
MCV RBC AUTO: 89.7 FL (ref 80.5–98.2)
MONOCYTES # BLD AUTO: 0.5 10*3/MM3 (ref 0.2–1.2)
MONOCYTES NFR BLD AUTO: 7.6 % (ref 5–12)
NEUTROPHILS # BLD AUTO: 3.1 10*3/MM3 (ref 1.9–8.1)
NEUTROPHILS NFR BLD AUTO: 47.4 % (ref 42.7–76)
PLATELET # BLD AUTO: 443 10*3/MM3 (ref 140–500)
RBC # BLD AUTO: 4.65 10*6/MM3 (ref 3.9–5.2)
RHEUMATOID FACT SERPL-ACNC: <10 IU/ML (ref 0–13.9)
TSH SERPL DL<=0.005 MIU/L-ACNC: 1.73 MIU/ML (ref 0.27–4.2)
WBC # BLD AUTO: 6.54 10*3/MM3 (ref 4.5–10.7)

## 2017-05-08 ENCOUNTER — HOSPITAL ENCOUNTER (EMERGENCY)
Facility: HOSPITAL | Age: 27
Discharge: HOME | End: 2017-05-08
Attending: EMERGENCY MEDICINE | Admitting: EMERGENCY MEDICINE

## 2017-05-08 VITALS
HEIGHT: 64 IN | TEMPERATURE: 97.3 F | OXYGEN SATURATION: 97 % | SYSTOLIC BLOOD PRESSURE: 102 MMHG | HEART RATE: 80 BPM | DIASTOLIC BLOOD PRESSURE: 76 MMHG | WEIGHT: 132 LBS | RESPIRATION RATE: 14 BRPM | BODY MASS INDEX: 22.53 KG/M2

## 2017-05-08 DIAGNOSIS — F41.0 PANIC ATTACKS: Primary | ICD-10-CM

## 2017-05-08 LAB
AMPHET+METHAMPHET UR QL: NEGATIVE
B-HCG UR QL: NEGATIVE
BARBITURATES UR QL SCN: NEGATIVE
BENZODIAZ UR QL SCN: NEGATIVE
CANNABINOIDS SERPL QL: NEGATIVE
COCAINE UR QL: NEGATIVE
ETHANOL BLD-MCNC: <10 MG/DL (ref 0–10)
ETHANOL UR QL: <0.01 %
LITHIUM SERPL-SCNC: 0.7 MMOL/L (ref 0.6–1.2)
METHADONE UR QL SCN: NEGATIVE
OPIATES UR QL: NEGATIVE
OXYCODONE UR QL SCN: NEGATIVE
WHOLE BLOOD HOLD SPECIMEN: NORMAL
WHOLE BLOOD HOLD SPECIMEN: NORMAL

## 2017-05-08 PROCEDURE — 80178 ASSAY OF LITHIUM: CPT | Performed by: NURSE PRACTITIONER

## 2017-05-08 PROCEDURE — 80307 DRUG TEST PRSMV CHEM ANLYZR: CPT | Performed by: NURSE PRACTITIONER

## 2017-05-08 PROCEDURE — 99283 EMERGENCY DEPT VISIT LOW MDM: CPT

## 2017-05-08 PROCEDURE — 81025 URINE PREGNANCY TEST: CPT | Performed by: EMERGENCY MEDICINE

## 2017-05-08 PROCEDURE — 90791 PSYCH DIAGNOSTIC EVALUATION: CPT

## 2017-05-08 RX ORDER — HYDROXYZINE PAMOATE 50 MG/1
50 CAPSULE ORAL 3 TIMES DAILY PRN
Qty: 24 CAPSULE | Refills: 0 | Status: SHIPPED | OUTPATIENT
Start: 2017-05-08 | End: 2018-01-02

## 2017-05-08 RX ORDER — ALPRAZOLAM 0.25 MG/1
0.25 TABLET ORAL ONCE
Status: COMPLETED | OUTPATIENT
Start: 2017-05-08 | End: 2017-05-08

## 2017-05-08 RX ADMIN — ALPRAZOLAM 0.25 MG: 0.25 TABLET ORAL at 10:10

## 2017-05-10 ENCOUNTER — TELEPHONE (OUTPATIENT)
Dept: SOCIAL WORK | Facility: HOSPITAL | Age: 27
End: 2017-05-10

## 2017-05-12 ENCOUNTER — OFFICE VISIT (OUTPATIENT)
Dept: FAMILY MEDICINE CLINIC | Facility: CLINIC | Age: 27
End: 2017-05-12

## 2017-05-12 VITALS
HEIGHT: 64 IN | TEMPERATURE: 98 F | OXYGEN SATURATION: 99 % | DIASTOLIC BLOOD PRESSURE: 78 MMHG | SYSTOLIC BLOOD PRESSURE: 100 MMHG | WEIGHT: 134 LBS | BODY MASS INDEX: 22.88 KG/M2 | HEART RATE: 97 BPM

## 2017-05-12 DIAGNOSIS — B00.1 COLD SORE: ICD-10-CM

## 2017-05-12 DIAGNOSIS — R59.0 ENLARGED SUBMENTAL LYMPH NODE: Primary | ICD-10-CM

## 2017-05-12 PROCEDURE — 99214 OFFICE O/P EST MOD 30 MIN: CPT | Performed by: NURSE PRACTITIONER

## 2017-05-12 RX ORDER — ESCITALOPRAM OXALATE 10 MG/1
10 TABLET ORAL DAILY
COMMUNITY
End: 2017-05-19

## 2017-05-12 RX ORDER — VALACYCLOVIR HYDROCHLORIDE 500 MG/1
500 TABLET, FILM COATED ORAL 2 TIMES DAILY
Qty: 10 TABLET | Refills: 0 | Status: ON HOLD | OUTPATIENT
Start: 2017-05-12 | End: 2017-05-26

## 2017-05-14 LAB
HSV SPEC CULT: NEGATIVE
Lab: NORMAL

## 2017-05-17 ENCOUNTER — HOSPITAL ENCOUNTER (EMERGENCY)
Facility: HOSPITAL | Age: 27
Discharge: HOME | End: 2017-05-17
Attending: EMERGENCY MEDICINE | Admitting: EMERGENCY MEDICINE

## 2017-05-17 VITALS
WEIGHT: 133.4 LBS | TEMPERATURE: 98.7 F | HEART RATE: 89 BPM | RESPIRATION RATE: 16 BRPM | OXYGEN SATURATION: 100 % | SYSTOLIC BLOOD PRESSURE: 115 MMHG | BODY MASS INDEX: 22.77 KG/M2 | DIASTOLIC BLOOD PRESSURE: 81 MMHG | HEIGHT: 64 IN

## 2017-05-17 DIAGNOSIS — T50.905A MEDICATION REACTION, INITIAL ENCOUNTER: Primary | ICD-10-CM

## 2017-05-17 PROCEDURE — 99282 EMERGENCY DEPT VISIT SF MDM: CPT

## 2017-05-19 ENCOUNTER — APPOINTMENT (OUTPATIENT)
Dept: PREADMISSION TESTING | Facility: HOSPITAL | Age: 27
End: 2017-05-19

## 2017-05-19 VITALS
TEMPERATURE: 98 F | SYSTOLIC BLOOD PRESSURE: 105 MMHG | WEIGHT: 133 LBS | DIASTOLIC BLOOD PRESSURE: 74 MMHG | OXYGEN SATURATION: 99 % | HEART RATE: 83 BPM | RESPIRATION RATE: 16 BRPM | BODY MASS INDEX: 22.71 KG/M2 | HEIGHT: 64 IN

## 2017-05-19 LAB
ANION GAP SERPL CALCULATED.3IONS-SCNC: 13.3 MMOL/L
BUN BLD-MCNC: 9 MG/DL (ref 6–20)
BUN/CREAT SERPL: 8.7 (ref 7–25)
CALCIUM SPEC-SCNC: 9.4 MG/DL (ref 8.6–10.5)
CHLORIDE SERPL-SCNC: 101 MMOL/L (ref 98–107)
CO2 SERPL-SCNC: 19.7 MMOL/L (ref 22–29)
CREAT BLD-MCNC: 1.04 MG/DL (ref 0.57–1)
DEPRECATED RDW RBC AUTO: 40.1 FL (ref 37–54)
ERYTHROCYTE [DISTWIDTH] IN BLOOD BY AUTOMATED COUNT: 12.5 % (ref 11.7–13)
GFR SERPL CREATININE-BSD FRML MDRD: 65 ML/MIN/1.73
GLUCOSE BLD-MCNC: 148 MG/DL (ref 65–99)
HCG SERPL QL: NEGATIVE
HCT VFR BLD AUTO: 41.8 % (ref 35.6–45.5)
HGB BLD-MCNC: 14 G/DL (ref 11.9–15.5)
MCH RBC QN AUTO: 29.5 PG (ref 26.9–32)
MCHC RBC AUTO-ENTMCNC: 33.5 G/DL (ref 32.4–36.3)
MCV RBC AUTO: 88 FL (ref 80.5–98.2)
PLATELET # BLD AUTO: 342 10*3/MM3 (ref 140–500)
PMV BLD AUTO: 9.8 FL (ref 6–12)
POTASSIUM BLD-SCNC: 3.7 MMOL/L (ref 3.5–5.2)
RBC # BLD AUTO: 4.75 10*6/MM3 (ref 3.9–5.2)
SODIUM BLD-SCNC: 134 MMOL/L (ref 136–145)
WBC NRBC COR # BLD: 6.28 10*3/MM3 (ref 4.5–10.7)

## 2017-05-19 PROCEDURE — 84703 CHORIONIC GONADOTROPIN ASSAY: CPT | Performed by: OBSTETRICS & GYNECOLOGY

## 2017-05-19 PROCEDURE — 85027 COMPLETE CBC AUTOMATED: CPT | Performed by: OBSTETRICS & GYNECOLOGY

## 2017-05-19 PROCEDURE — 80048 BASIC METABOLIC PNL TOTAL CA: CPT | Performed by: OBSTETRICS & GYNECOLOGY

## 2017-05-19 PROCEDURE — 36415 COLL VENOUS BLD VENIPUNCTURE: CPT

## 2017-05-19 RX ORDER — UREA 10 %
10 LOTION (ML) TOPICAL NIGHTLY
COMMUNITY
End: 2018-01-02

## 2017-05-26 ENCOUNTER — ANESTHESIA EVENT (OUTPATIENT)
Dept: PERIOP | Facility: HOSPITAL | Age: 27
End: 2017-05-26

## 2017-05-26 ENCOUNTER — HOSPITAL ENCOUNTER (OUTPATIENT)
Facility: HOSPITAL | Age: 27
Setting detail: HOSPITAL OUTPATIENT SURGERY
Discharge: HOME | End: 2017-05-26
Attending: OBSTETRICS & GYNECOLOGY | Admitting: OBSTETRICS & GYNECOLOGY

## 2017-05-26 ENCOUNTER — ANESTHESIA (OUTPATIENT)
Dept: PERIOP | Facility: HOSPITAL | Age: 27
End: 2017-05-26

## 2017-05-26 ENCOUNTER — TELEPHONE (OUTPATIENT)
Dept: SOCIAL WORK | Facility: HOSPITAL | Age: 27
End: 2017-05-26

## 2017-05-26 VITALS
HEART RATE: 62 BPM | HEIGHT: 64 IN | SYSTOLIC BLOOD PRESSURE: 100 MMHG | DIASTOLIC BLOOD PRESSURE: 65 MMHG | TEMPERATURE: 97.4 F | OXYGEN SATURATION: 100 % | BODY MASS INDEX: 23.08 KG/M2 | WEIGHT: 135.19 LBS | RESPIRATION RATE: 18 BRPM

## 2017-05-26 LAB
B-HCG UR QL: NEGATIVE
INTERNAL NEGATIVE CONTROL: NEGATIVE
INTERNAL POSITIVE CONTROL: POSITIVE
Lab: NORMAL

## 2017-05-26 PROCEDURE — 25010000002 FENTANYL CITRATE (PF) 100 MCG/2ML SOLUTION: Performed by: NURSE ANESTHETIST, CERTIFIED REGISTERED

## 2017-05-26 PROCEDURE — 25010000002 MIDAZOLAM PER 1 MG: Performed by: ANESTHESIOLOGY

## 2017-05-26 PROCEDURE — 25010000002 ONDANSETRON PER 1 MG: Performed by: NURSE ANESTHETIST, CERTIFIED REGISTERED

## 2017-05-26 PROCEDURE — 25010000002 DEXAMETHASONE PER 1 MG: Performed by: NURSE ANESTHETIST, CERTIFIED REGISTERED

## 2017-05-26 PROCEDURE — 25010000002 PROPOFOL 10 MG/ML EMULSION: Performed by: NURSE ANESTHETIST, CERTIFIED REGISTERED

## 2017-05-26 PROCEDURE — 25010000002 NEOSTIGMINE 10 MG/10ML SOLUTION: Performed by: NURSE ANESTHETIST, CERTIFIED REGISTERED

## 2017-05-26 PROCEDURE — 25010000002 KETOROLAC TROMETHAMINE PER 15 MG: Performed by: NURSE ANESTHETIST, CERTIFIED REGISTERED

## 2017-05-26 PROCEDURE — 58670 LAPAROSCOPY TUBAL CAUTERY: CPT | Performed by: OBSTETRICS & GYNECOLOGY

## 2017-05-26 RX ORDER — FENTANYL CITRATE 50 UG/ML
50 INJECTION, SOLUTION INTRAMUSCULAR; INTRAVENOUS
Status: DISCONTINUED | OUTPATIENT
Start: 2017-05-26 | End: 2017-05-26 | Stop reason: HOSPADM

## 2017-05-26 RX ORDER — ACETAMINOPHEN 500 MG
1000 TABLET ORAL EVERY 6 HOURS PRN
COMMUNITY

## 2017-05-26 RX ORDER — NALOXONE HCL 0.4 MG/ML
0.2 VIAL (ML) INJECTION AS NEEDED
Status: DISCONTINUED | OUTPATIENT
Start: 2017-05-26 | End: 2017-05-26 | Stop reason: HOSPADM

## 2017-05-26 RX ORDER — SODIUM CHLORIDE, SODIUM LACTATE, POTASSIUM CHLORIDE, CALCIUM CHLORIDE 600; 310; 30; 20 MG/100ML; MG/100ML; MG/100ML; MG/100ML
9 INJECTION, SOLUTION INTRAVENOUS CONTINUOUS
Status: DISCONTINUED | OUTPATIENT
Start: 2017-05-26 | End: 2017-05-26 | Stop reason: HOSPADM

## 2017-05-26 RX ORDER — OXYCODONE AND ACETAMINOPHEN 7.5; 325 MG/1; MG/1
1 TABLET ORAL ONCE AS NEEDED
Status: DISCONTINUED | OUTPATIENT
Start: 2017-05-26 | End: 2017-05-26 | Stop reason: HOSPADM

## 2017-05-26 RX ORDER — DEXAMETHASONE SODIUM PHOSPHATE 10 MG/ML
INJECTION INTRAMUSCULAR; INTRAVENOUS AS NEEDED
Status: DISCONTINUED | OUTPATIENT
Start: 2017-05-26 | End: 2017-05-26 | Stop reason: SURG

## 2017-05-26 RX ORDER — GLYCOPYRROLATE 0.2 MG/ML
INJECTION INTRAMUSCULAR; INTRAVENOUS AS NEEDED
Status: DISCONTINUED | OUTPATIENT
Start: 2017-05-26 | End: 2017-05-26 | Stop reason: SURG

## 2017-05-26 RX ORDER — FENTANYL CITRATE 50 UG/ML
INJECTION, SOLUTION INTRAMUSCULAR; INTRAVENOUS AS NEEDED
Status: DISCONTINUED | OUTPATIENT
Start: 2017-05-26 | End: 2017-05-26 | Stop reason: SURG

## 2017-05-26 RX ORDER — LIDOCAINE HYDROCHLORIDE 20 MG/ML
INJECTION, SOLUTION INFILTRATION; PERINEURAL AS NEEDED
Status: DISCONTINUED | OUTPATIENT
Start: 2017-05-26 | End: 2017-05-26 | Stop reason: SURG

## 2017-05-26 RX ORDER — FAMOTIDINE 10 MG/ML
20 INJECTION, SOLUTION INTRAVENOUS ONCE
Status: COMPLETED | OUTPATIENT
Start: 2017-05-26 | End: 2017-05-26

## 2017-05-26 RX ORDER — FLUMAZENIL 0.1 MG/ML
0.2 INJECTION INTRAVENOUS AS NEEDED
Status: DISCONTINUED | OUTPATIENT
Start: 2017-05-26 | End: 2017-05-26 | Stop reason: HOSPADM

## 2017-05-26 RX ORDER — PROMETHAZINE HYDROCHLORIDE 25 MG/ML
12.5 INJECTION, SOLUTION INTRAMUSCULAR; INTRAVENOUS ONCE AS NEEDED
Status: DISCONTINUED | OUTPATIENT
Start: 2017-05-26 | End: 2017-05-26 | Stop reason: HOSPADM

## 2017-05-26 RX ORDER — DIPHENHYDRAMINE HYDROCHLORIDE 50 MG/ML
12.5 INJECTION INTRAMUSCULAR; INTRAVENOUS
Status: DISCONTINUED | OUTPATIENT
Start: 2017-05-26 | End: 2017-05-26 | Stop reason: HOSPADM

## 2017-05-26 RX ORDER — ONDANSETRON 2 MG/ML
4 INJECTION INTRAMUSCULAR; INTRAVENOUS ONCE AS NEEDED
Status: DISCONTINUED | OUTPATIENT
Start: 2017-05-26 | End: 2017-05-26 | Stop reason: HOSPADM

## 2017-05-26 RX ORDER — ROCURONIUM BROMIDE 10 MG/ML
INJECTION, SOLUTION INTRAVENOUS AS NEEDED
Status: DISCONTINUED | OUTPATIENT
Start: 2017-05-26 | End: 2017-05-26 | Stop reason: SURG

## 2017-05-26 RX ORDER — NEOSTIGMINE METHYLSULFATE 1 MG/ML
INJECTION, SOLUTION INTRAVENOUS AS NEEDED
Status: DISCONTINUED | OUTPATIENT
Start: 2017-05-26 | End: 2017-05-26 | Stop reason: SURG

## 2017-05-26 RX ORDER — BUPIVACAINE HYDROCHLORIDE 5 MG/ML
INJECTION, SOLUTION PERINEURAL AS NEEDED
Status: DISCONTINUED | OUTPATIENT
Start: 2017-05-26 | End: 2017-05-26 | Stop reason: HOSPADM

## 2017-05-26 RX ORDER — LABETALOL HYDROCHLORIDE 5 MG/ML
5 INJECTION, SOLUTION INTRAVENOUS
Status: DISCONTINUED | OUTPATIENT
Start: 2017-05-26 | End: 2017-05-26 | Stop reason: HOSPADM

## 2017-05-26 RX ORDER — PROMETHAZINE HYDROCHLORIDE 25 MG/1
25 TABLET ORAL ONCE AS NEEDED
Status: DISCONTINUED | OUTPATIENT
Start: 2017-05-26 | End: 2017-05-26 | Stop reason: HOSPADM

## 2017-05-26 RX ORDER — ONDANSETRON 2 MG/ML
INJECTION INTRAMUSCULAR; INTRAVENOUS AS NEEDED
Status: DISCONTINUED | OUTPATIENT
Start: 2017-05-26 | End: 2017-05-26 | Stop reason: SURG

## 2017-05-26 RX ORDER — HYDROCODONE BITARTRATE AND ACETAMINOPHEN 5; 325 MG/1; MG/1
1-2 TABLET ORAL EVERY 4 HOURS PRN
Qty: 24 TABLET | Refills: 0 | Status: SHIPPED | OUTPATIENT
Start: 2017-05-26 | End: 2018-01-02

## 2017-05-26 RX ORDER — PROMETHAZINE HYDROCHLORIDE 25 MG/1
12.5 TABLET ORAL ONCE AS NEEDED
Status: DISCONTINUED | OUTPATIENT
Start: 2017-05-26 | End: 2017-05-26 | Stop reason: HOSPADM

## 2017-05-26 RX ORDER — HYDROCODONE BITARTRATE AND ACETAMINOPHEN 7.5; 325 MG/1; MG/1
1 TABLET ORAL EVERY 6 HOURS PRN
COMMUNITY
End: 2017-06-06

## 2017-05-26 RX ORDER — MIDAZOLAM HYDROCHLORIDE 1 MG/ML
2 INJECTION INTRAMUSCULAR; INTRAVENOUS
Status: DISCONTINUED | OUTPATIENT
Start: 2017-05-26 | End: 2017-05-26 | Stop reason: HOSPADM

## 2017-05-26 RX ORDER — PROMETHAZINE HYDROCHLORIDE 25 MG/1
25 TABLET ORAL EVERY 6 HOURS PRN
Qty: 10 TABLET | Refills: 1 | Status: SHIPPED | OUTPATIENT
Start: 2017-05-26 | End: 2018-01-02

## 2017-05-26 RX ORDER — MAGNESIUM HYDROXIDE 1200 MG/15ML
LIQUID ORAL AS NEEDED
Status: DISCONTINUED | OUTPATIENT
Start: 2017-05-26 | End: 2017-05-26 | Stop reason: HOSPADM

## 2017-05-26 RX ORDER — IBUPROFEN 800 MG/1
800 TABLET ORAL EVERY 6 HOURS PRN
COMMUNITY
End: 2018-04-06

## 2017-05-26 RX ORDER — HYDROCODONE BITARTRATE AND ACETAMINOPHEN 7.5; 325 MG/1; MG/1
1 TABLET ORAL ONCE AS NEEDED
Status: COMPLETED | OUTPATIENT
Start: 2017-05-26 | End: 2017-05-26

## 2017-05-26 RX ORDER — HYDRALAZINE HYDROCHLORIDE 20 MG/ML
5 INJECTION INTRAMUSCULAR; INTRAVENOUS
Status: DISCONTINUED | OUTPATIENT
Start: 2017-05-26 | End: 2017-05-26 | Stop reason: HOSPADM

## 2017-05-26 RX ORDER — PROMETHAZINE HYDROCHLORIDE 25 MG/1
25 SUPPOSITORY RECTAL ONCE AS NEEDED
Status: DISCONTINUED | OUTPATIENT
Start: 2017-05-26 | End: 2017-05-26 | Stop reason: HOSPADM

## 2017-05-26 RX ORDER — MIDAZOLAM HYDROCHLORIDE 1 MG/ML
1 INJECTION INTRAMUSCULAR; INTRAVENOUS
Status: DISCONTINUED | OUTPATIENT
Start: 2017-05-26 | End: 2017-05-26 | Stop reason: HOSPADM

## 2017-05-26 RX ORDER — KETOROLAC TROMETHAMINE 30 MG/ML
INJECTION, SOLUTION INTRAMUSCULAR; INTRAVENOUS AS NEEDED
Status: DISCONTINUED | OUTPATIENT
Start: 2017-05-26 | End: 2017-05-26 | Stop reason: SURG

## 2017-05-26 RX ORDER — HYDROMORPHONE HYDROCHLORIDE 1 MG/ML
0.5 INJECTION, SOLUTION INTRAMUSCULAR; INTRAVENOUS; SUBCUTANEOUS
Status: DISCONTINUED | OUTPATIENT
Start: 2017-05-26 | End: 2017-05-26 | Stop reason: HOSPADM

## 2017-05-26 RX ORDER — SODIUM CHLORIDE 0.9 % (FLUSH) 0.9 %
1-10 SYRINGE (ML) INJECTION AS NEEDED
Status: DISCONTINUED | OUTPATIENT
Start: 2017-05-26 | End: 2017-05-26 | Stop reason: HOSPADM

## 2017-05-26 RX ORDER — PROPOFOL 10 MG/ML
VIAL (ML) INTRAVENOUS AS NEEDED
Status: DISCONTINUED | OUTPATIENT
Start: 2017-05-26 | End: 2017-05-26 | Stop reason: SURG

## 2017-05-26 RX ADMIN — SODIUM CHLORIDE, POTASSIUM CHLORIDE, SODIUM LACTATE AND CALCIUM CHLORIDE 9 ML/HR: 600; 310; 30; 20 INJECTION, SOLUTION INTRAVENOUS at 07:24

## 2017-05-26 RX ADMIN — GLYCOPYRROLATE 0.8 MG: 0.2 INJECTION INTRAMUSCULAR; INTRAVENOUS at 08:26

## 2017-05-26 RX ADMIN — ROCURONIUM BROMIDE 30 MG: 10 INJECTION INTRAVENOUS at 07:53

## 2017-05-26 RX ADMIN — FENTANYL CITRATE 50 MCG: 50 INJECTION INTRAMUSCULAR; INTRAVENOUS at 08:58

## 2017-05-26 RX ADMIN — ONDANSETRON 4 MG: 2 INJECTION INTRAMUSCULAR; INTRAVENOUS at 08:25

## 2017-05-26 RX ADMIN — KETOROLAC TROMETHAMINE 30 MG: 30 INJECTION, SOLUTION INTRAMUSCULAR; INTRAVENOUS at 08:25

## 2017-05-26 RX ADMIN — PROPOFOL 150 MG: 10 INJECTION, EMULSION INTRAVENOUS at 07:53

## 2017-05-26 RX ADMIN — FAMOTIDINE 20 MG: 10 INJECTION, SOLUTION INTRAVENOUS at 06:46

## 2017-05-26 RX ADMIN — SODIUM CHLORIDE, POTASSIUM CHLORIDE, SODIUM LACTATE AND CALCIUM CHLORIDE: 600; 310; 30; 20 INJECTION, SOLUTION INTRAVENOUS at 08:25

## 2017-05-26 RX ADMIN — LIDOCAINE HYDROCHLORIDE 60 MG: 20 INJECTION, SOLUTION INFILTRATION; PERINEURAL at 07:53

## 2017-05-26 RX ADMIN — HYDROCODONE BITARTRATE AND ACETAMINOPHEN 1 TABLET: 7.5; 325 TABLET ORAL at 09:19

## 2017-05-26 RX ADMIN — FENTANYL CITRATE 50 MCG: 50 INJECTION INTRAMUSCULAR; INTRAVENOUS at 08:21

## 2017-05-26 RX ADMIN — FENTANYL CITRATE 50 MCG: 50 INJECTION INTRAMUSCULAR; INTRAVENOUS at 09:10

## 2017-05-26 RX ADMIN — FENTANYL CITRATE 50 MCG: 50 INJECTION INTRAMUSCULAR; INTRAVENOUS at 07:53

## 2017-05-26 RX ADMIN — MIDAZOLAM 2 MG: 1 INJECTION INTRAMUSCULAR; INTRAVENOUS at 07:24

## 2017-05-26 RX ADMIN — NEOSTIGMINE METHYLSULFATE 5 MG: 1 INJECTION INTRAVENOUS at 08:26

## 2017-05-26 RX ADMIN — DEXAMETHASONE SODIUM PHOSPHATE 4 MG: 10 INJECTION INTRAMUSCULAR; INTRAVENOUS at 08:15

## 2017-05-26 RX ADMIN — PROPOFOL 50 MG: 10 INJECTION, EMULSION INTRAVENOUS at 08:21

## 2017-05-30 ENCOUNTER — TELEPHONE (OUTPATIENT)
Dept: FAMILY MEDICINE CLINIC | Facility: CLINIC | Age: 27
End: 2017-05-30

## 2017-06-06 ENCOUNTER — HOSPITAL ENCOUNTER (OUTPATIENT)
Dept: GENERAL RADIOLOGY | Facility: HOSPITAL | Age: 27
Discharge: HOME | End: 2017-06-06
Attending: INTERNAL MEDICINE | Admitting: INTERNAL MEDICINE

## 2017-06-06 ENCOUNTER — OFFICE VISIT (OUTPATIENT)
Dept: GASTROENTEROLOGY | Facility: CLINIC | Age: 27
End: 2017-06-06

## 2017-06-06 VITALS
SYSTOLIC BLOOD PRESSURE: 84 MMHG | DIASTOLIC BLOOD PRESSURE: 62 MMHG | WEIGHT: 136 LBS | HEIGHT: 64 IN | BODY MASS INDEX: 23.22 KG/M2

## 2017-06-06 DIAGNOSIS — K59.00 CONSTIPATION, UNSPECIFIED CONSTIPATION TYPE: Primary | ICD-10-CM

## 2017-06-06 DIAGNOSIS — K59.00 CONSTIPATION, UNSPECIFIED CONSTIPATION TYPE: ICD-10-CM

## 2017-06-06 DIAGNOSIS — K58.8 OTHER IRRITABLE BOWEL SYNDROME: ICD-10-CM

## 2017-06-06 PROCEDURE — 99203 OFFICE O/P NEW LOW 30 MIN: CPT | Performed by: INTERNAL MEDICINE

## 2017-06-06 PROCEDURE — 74000 HC ABDOMEN KUB: CPT

## 2017-06-06 RX ORDER — CETIRIZINE HYDROCHLORIDE 5 MG/1
5 TABLET ORAL DAILY
COMMUNITY
End: 2018-01-02

## 2017-06-06 RX ORDER — LUBIPROSTONE 8 UG/1
8 CAPSULE ORAL 2 TIMES DAILY WITH MEALS
Qty: 60 CAPSULE | Refills: 3 | Status: SHIPPED | OUTPATIENT
Start: 2017-06-06 | End: 2018-01-02

## 2017-06-06 RX ORDER — ONDANSETRON 4 MG/1
4 TABLET, FILM COATED ORAL EVERY 8 HOURS PRN
Qty: 30 TABLET | Refills: 5 | Status: SHIPPED | OUTPATIENT
Start: 2017-06-06 | End: 2018-01-02

## 2017-06-06 NOTE — PROGRESS NOTES
Chief Complaint   Patient presents with   • Diarrhea   • Constipation     mostly     Subjective      HPI     Violeta Vargas is a 26 y.o. female who presents for evaluation of fluctuation in bowel habit.  Her symptoms have been present for last 5 years or so. She describes periods of constipation and diarrhea, which seem to be random, although she does seem to reside more on the constipation spectrum.  She does take daily OTC laxative with little effect.  She goes on average 4-5 days between bowel movements. She reports she had colonoscopy in 2012 with Dr. Naidu for these symptoms that was normal. She has been on intermittent pain medication over last few weeks from tubal ligation and this has worsened her symptoms.  She will have occasional blood on tissue with straining.  No unintentional weight loss.  PMHx is significant for bipolar d/o for which she takes lithium.  Her most recent levels were normal. She has had recent TSH testing which was also normal.        Past Medical History:   Diagnosis Date   • Anxiety    • Arthritis    • Bipolar 1 disorder    • Cold sore    • Depression    • History of asthma     ALLERGY RELATED - NO INHALERS   • History of iron deficiency anemia    • IBS (irritable bowel syndrome)    • Injury of back    • Migraines        Current Outpatient Prescriptions:   •  acetaminophen (TYLENOL) 500 MG tablet, Take 1,000 mg by mouth Every 6 (Six) Hours As Needed for Mild Pain (1-3)., Disp: , Rfl:   •  cetirizine (zyrTEC) 5 MG tablet, Take 5 mg by mouth Daily., Disp: , Rfl:   •  clobetasol (TEMOVATE) 0.05 % external solution, Apply  topically As Needed., Disp: , Rfl:   •  HYDROcodone-acetaminophen (NORCO) 5-325 MG per tablet, Take 1-2 tablets by mouth Every 4 (Four) Hours As Needed (Pain)., Disp: 24 tablet, Rfl: 0  •  hydrOXYzine (VISTARIL) 50 MG capsule, Take 1 capsule by mouth 3 (Three) Times a Day As Needed for Anxiety., Disp: 24 capsule, Rfl: 0  •  ibuprofen (ADVIL,MOTRIN) 800 MG tablet,  Take 800 mg by mouth Every 6 (Six) Hours As Needed for Mild Pain (1-3)., Disp: , Rfl:   •  JUNEL 1/20 1-20 MG-MCG per tablet, Take 1 tablet by mouth Daily., Disp: 21 tablet, Rfl: 12  •  lithium 300 MG tablet, Take 600 mg by mouth 2 (Two) Times a Day., Disp: , Rfl:   •  melatonin 1 MG tablet, Take 10 mg by mouth Every Night., Disp: , Rfl:   •  ondansetron (ZOFRAN) 4 MG tablet, Take 1 tablet by mouth Every 8 (Eight) Hours As Needed for Nausea or Vomiting., Disp: 30 tablet, Rfl: 5  •  promethazine (PHENERGAN) 25 MG tablet, Take 1 tablet by mouth Every 6 (Six) Hours As Needed for Nausea., Disp: 10 tablet, Rfl: 1  •  vitamin B-12 (CYANOCOBALAMIN) 1000 MCG tablet, Take 1,000 mcg by mouth daily., Disp: , Rfl:   •  lubiprostone (AMITIZA) 8 MCG capsule, Take 1 capsule by mouth 2 (Two) Times a Day With Meals., Disp: 60 capsule, Rfl: 3     Allergies   Allergen Reactions   • Lamotrigine Rash     Social History     Social History   • Marital status:      Spouse name: N/A   • Number of children: N/A   • Years of education: N/A     Occupational History   • Not on file.     Social History Main Topics   • Smoking status: Never Smoker   • Smokeless tobacco: Never Used   • Alcohol use Yes      Comment: SOCIAL   • Drug use: No   • Sexual activity: Not on file     Other Topics Concern   • Not on file     Social History Narrative     Family History   Problem Relation Age of Onset   • Adopted: Yes   • Bipolar disorder Mother    • Suicide Attempts Mother    • Bipolar disorder Father    • No Known Problems Sister    • No Known Problems Brother    • No Known Problems Son    • No Known Problems Daughter    • No Known Problems Maternal Grandmother    • No Known Problems Maternal Grandfather    • No Known Problems Paternal Grandmother    • No Known Problems Paternal Grandfather    • No Known Problems Cousin      Review of Systems   Constitutional: Negative.    HENT: Negative.    Respiratory: Negative.    Cardiovascular: Negative.     Gastrointestinal: Positive for constipation and nausea.   Psychiatric/Behavioral: Positive for behavioral problems.        Objective   Vitals:    06/06/17 1351   BP: (!) 84/62     Physical Exam   Constitutional: She is oriented to person, place, and time. She appears well-developed and well-nourished.   HENT:   Head: Normocephalic and atraumatic.   Mouth/Throat: Oropharynx is clear and moist.   Eyes: EOM are normal. No scleral icterus.   Neck: Normal range of motion. Neck supple. No thyromegaly present.   Cardiovascular: Normal rate, regular rhythm and normal heart sounds.  Exam reveals no gallop and no friction rub.    No murmur heard.  Pulmonary/Chest: Effort normal and breath sounds normal. She has no wheezes. She has no rales. She exhibits no tenderness.   Abdominal: Soft. Bowel sounds are normal. She exhibits no distension. There is no tenderness. There is no rebound and no guarding. No hernia.   Musculoskeletal: Normal range of motion. She exhibits no edema.   Lymphadenopathy:     She has no cervical adenopathy.   Neurological: She is alert and oriented to person, place, and time.   Skin: Skin is warm and dry.   Psychiatric: She has a normal mood and affect. Judgment and thought content normal.   Vitals reviewed.       Assessment/Plan      Violeta was seen today for diarrhea and constipation.    Diagnoses and all orders for this visit:    Constipation, unspecified constipation type  -     XR Abdomen KUB; Future    Other irritable bowel syndrome    Other orders  -     lubiprostone (AMITIZA) 8 MCG capsule; Take 1 capsule by mouth 2 (Two) Times a Day With Meals.  -     ondansetron (ZOFRAN) 4 MG tablet; Take 1 tablet by mouth Every 8 (Eight) Hours As Needed for Nausea or Vomiting.            Aram Wong M.D.  [facility]  [address] [place] [address]  Office: [phone]

## 2017-06-07 ENCOUNTER — TELEPHONE (OUTPATIENT)
Dept: ORTHOPEDIC SURGERY | Facility: CLINIC | Age: 27
End: 2017-06-07

## 2017-06-07 NOTE — TELEPHONE ENCOUNTER
"Please review message below from the patient and advise.     Thank You     ----- Message from Violeta Vargas sent at 6/7/2017 10:02 AM EDT -----    Regarding: Non-Urgent Medical Question    Hello,    I was wondering if I have any activity restrictions considering I have been discharged and the \"6 month\" healing window is coming up. With it being summertime I just wanted to be perfectly sure on what I should and shouldn't be doing. I'm moving really well and am working on strength training and at-home PT but I didn't want to be confused between moving well and restrictions. Thanks for all your help!  Violeta Vargas    "

## 2017-06-07 NOTE — PROGRESS NOTES
KUB shows a good amount of stool in rectum, and scattered stool throughout rest of colon.   Before she starts the Amitiza, I would like her to do a day or two of Fleets liquid glycerin suppository (one/day) and a bottle of magnesium citrate.  If that doesn't work, she will need to try a Fleets enema.  Once she has had a significant BM, she can start the Amitiza.

## 2017-06-07 NOTE — TELEPHONE ENCOUNTER
Please let her know that there are no restrictions necessary at this point.  If she has any problems or issues, I will be happy to see her back.  Otherwise, the tissue should be fully healed and she can follow-up as needed.

## 2017-06-13 ENCOUNTER — OFFICE VISIT (OUTPATIENT)
Dept: OBSTETRICS AND GYNECOLOGY | Facility: CLINIC | Age: 27
End: 2017-06-13

## 2017-06-13 DIAGNOSIS — Z98.890 POST-OPERATIVE STATE: Primary | ICD-10-CM

## 2017-06-13 PROCEDURE — 99024 POSTOP FOLLOW-UP VISIT: CPT | Performed by: OBSTETRICS & GYNECOLOGY

## 2017-06-13 NOTE — PROGRESS NOTES
Subjective   Violeta Vargas is a 26 y.o. female here today for post tubal ligation visit..     History of Present Illness-patient doing well postop and does not have any complaints.    The following portions of the patient's history were reviewed and updated as appropriate: allergies, current medications, past family history, past medical history, past social history, past surgical history and problem list.    Review of Systems   Constitutional: Negative.    Gastrointestinal: Negative.    Genitourinary: Negative.    Neurological: Negative.    Psychiatric/Behavioral: Negative.        Objective   Physical Exam   Constitutional: She is oriented to person, place, and time. She appears well-developed and well-nourished.   HENT:   Head: Normocephalic and atraumatic.   Eyes: Pupils are equal, round, and reactive to light.   Pulmonary/Chest: Effort normal.   Abdominal:   Umbilical incision is well-healed.   Neurological: She is alert and oriented to person, place, and time. She has normal reflexes.   Psychiatric: She has a normal mood and affect. Her behavior is normal. Judgment and thought content normal.   Nursing note and vitals reviewed.      Assessment/Plan   Violeta was seen today for post-op follow-up.    Diagnoses and all orders for this visit:    Post-operative state       Operative findings discussed with patient.  Return in 5 months for her annual exam.

## 2017-06-15 ENCOUNTER — HOSPITAL ENCOUNTER (EMERGENCY)
Facility: HOSPITAL | Age: 27
Discharge: HOME | End: 2017-06-15
Attending: EMERGENCY MEDICINE | Admitting: EMERGENCY MEDICINE

## 2017-06-15 VITALS
RESPIRATION RATE: 16 BRPM | OXYGEN SATURATION: 98 % | BODY MASS INDEX: 22.88 KG/M2 | TEMPERATURE: 98 F | HEART RATE: 81 BPM | WEIGHT: 134 LBS | SYSTOLIC BLOOD PRESSURE: 112 MMHG | DIASTOLIC BLOOD PRESSURE: 69 MMHG | HEIGHT: 64 IN

## 2017-06-15 DIAGNOSIS — R42 LIGHTHEADED: Primary | ICD-10-CM

## 2017-06-15 DIAGNOSIS — R11.0 NAUSEA: ICD-10-CM

## 2017-06-15 LAB
ALBUMIN SERPL-MCNC: 4.6 G/DL (ref 3.5–5.2)
ALBUMIN/GLOB SERPL: 1.6 G/DL
ALP SERPL-CCNC: 64 U/L (ref 39–117)
ALT SERPL W P-5'-P-CCNC: 9 U/L (ref 1–33)
ANION GAP SERPL CALCULATED.3IONS-SCNC: 15.8 MMOL/L
AST SERPL-CCNC: 13 U/L (ref 1–32)
BASOPHILS # BLD AUTO: 0.01 10*3/MM3 (ref 0–0.2)
BASOPHILS NFR BLD AUTO: 0.1 % (ref 0–1.5)
BILIRUB SERPL-MCNC: <0.2 MG/DL (ref 0.1–1.2)
BUN BLD-MCNC: 9 MG/DL (ref 6–20)
BUN/CREAT SERPL: 10.5 (ref 7–25)
CALCIUM SPEC-SCNC: 9.9 MG/DL (ref 8.6–10.5)
CHLORIDE SERPL-SCNC: 99 MMOL/L (ref 98–107)
CK SERPL-CCNC: 57 U/L (ref 20–180)
CO2 SERPL-SCNC: 23.2 MMOL/L (ref 22–29)
CREAT BLD-MCNC: 0.86 MG/DL (ref 0.57–1)
DEPRECATED RDW RBC AUTO: 42.2 FL (ref 37–54)
EOSINOPHIL # BLD AUTO: 0.18 10*3/MM3 (ref 0–0.7)
EOSINOPHIL NFR BLD AUTO: 2.3 % (ref 0.3–6.2)
ERYTHROCYTE [DISTWIDTH] IN BLOOD BY AUTOMATED COUNT: 13.1 % (ref 11.7–13)
GFR SERPL CREATININE-BSD FRML MDRD: 80 ML/MIN/1.73
GLOBULIN UR ELPH-MCNC: 2.9 GM/DL
GLUCOSE BLD-MCNC: 85 MG/DL (ref 65–99)
HCG SERPL QL: NEGATIVE
HCT VFR BLD AUTO: 40.1 % (ref 35.6–45.5)
HGB BLD-MCNC: 13.1 G/DL (ref 11.9–15.5)
IMM GRANULOCYTES # BLD: 0.04 10*3/MM3 (ref 0–0.03)
IMM GRANULOCYTES NFR BLD: 0.5 % (ref 0–0.5)
LITHIUM SERPL-SCNC: 1.6 MMOL/L (ref 0.6–1.2)
LYMPHOCYTES # BLD AUTO: 2.58 10*3/MM3 (ref 0.9–4.8)
LYMPHOCYTES NFR BLD AUTO: 33 % (ref 19.6–45.3)
MCH RBC QN AUTO: 28.7 PG (ref 26.9–32)
MCHC RBC AUTO-ENTMCNC: 32.7 G/DL (ref 32.4–36.3)
MCV RBC AUTO: 87.7 FL (ref 80.5–98.2)
MONOCYTES # BLD AUTO: 0.36 10*3/MM3 (ref 0.2–1.2)
MONOCYTES NFR BLD AUTO: 4.6 % (ref 5–12)
NEUTROPHILS # BLD AUTO: 4.64 10*3/MM3 (ref 1.9–8.1)
NEUTROPHILS NFR BLD AUTO: 59.5 % (ref 42.7–76)
PLATELET # BLD AUTO: 443 10*3/MM3 (ref 140–500)
PMV BLD AUTO: 9.6 FL (ref 6–12)
POTASSIUM BLD-SCNC: 3.9 MMOL/L (ref 3.5–5.2)
PROT SERPL-MCNC: 7.5 G/DL (ref 6–8.5)
RBC # BLD AUTO: 4.57 10*6/MM3 (ref 3.9–5.2)
SODIUM BLD-SCNC: 138 MMOL/L (ref 136–145)
TROPONIN T SERPL-MCNC: <0.01 NG/ML (ref 0–0.03)
WBC NRBC COR # BLD: 7.81 10*3/MM3 (ref 4.5–10.7)

## 2017-06-15 PROCEDURE — 99283 EMERGENCY DEPT VISIT LOW MDM: CPT

## 2017-06-15 PROCEDURE — 85025 COMPLETE CBC W/AUTO DIFF WBC: CPT | Performed by: EMERGENCY MEDICINE

## 2017-06-15 PROCEDURE — 93010 ELECTROCARDIOGRAM REPORT: CPT | Performed by: INTERNAL MEDICINE

## 2017-06-15 PROCEDURE — 84484 ASSAY OF TROPONIN QUANT: CPT | Performed by: EMERGENCY MEDICINE

## 2017-06-15 PROCEDURE — 96360 HYDRATION IV INFUSION INIT: CPT

## 2017-06-15 PROCEDURE — 82550 ASSAY OF CK (CPK): CPT | Performed by: EMERGENCY MEDICINE

## 2017-06-15 PROCEDURE — 80053 COMPREHEN METABOLIC PANEL: CPT | Performed by: EMERGENCY MEDICINE

## 2017-06-15 PROCEDURE — 93005 ELECTROCARDIOGRAM TRACING: CPT | Performed by: EMERGENCY MEDICINE

## 2017-06-15 PROCEDURE — 84703 CHORIONIC GONADOTROPIN ASSAY: CPT | Performed by: EMERGENCY MEDICINE

## 2017-06-15 PROCEDURE — 80178 ASSAY OF LITHIUM: CPT | Performed by: EMERGENCY MEDICINE

## 2017-06-15 RX ADMIN — SODIUM CHLORIDE 1000 ML: 9 INJECTION, SOLUTION INTRAVENOUS at 12:57

## 2017-06-15 NOTE — ED TRIAGE NOTES
"Patient found down by a registration employee in the parking lot.  Patient was driving self to ER for evaluation of \"heat exhaustion\".    "

## 2017-06-15 NOTE — ED PROVIDER NOTES
"EMERGENCY DEPARTMENT ENCOUNTER       CHIEF COMPLAINT  Chief Complaint: Dizziness  History given by: Patient  History limited by: N/A  Room Number: 21/21  PMD: James Epley, APRN      HPI:  HPI Comments: Pt reports that for the past several days, pt has had intermittent episodes of dizziness described as \"lightheadedness\", generalized weakness, nausea, and subjective fevers. Pt reports that while pt was driving to the ER today, pt had an episode of lightheadedness. Upon arrival to the ER, pt fell in the parking lot due to the lightheadedness. However, pt reports that she did not lose consciousness nor sustain any significant injuries (including head injury) during the fall. Pt denies dyspnea, vomiting, CP, abd pain, HA, focal weakness/numbness, and speech/visual difficulties. Pt states that she does not have air conditioning in her home; pt is concerned that she has been dehydrated due to prolonged heat exposure in her home. Pt is also on Valium due to h/o bipolar disorder. There are no other complaints at this time.         Patient is a 26 y.o. female presenting with dizziness.   Dizziness   Quality:  Lightheadedness  Severity:  Moderate  Onset quality:  Gradual  Duration: onset several days ago.  Timing:  Intermittent  Progression:  Waxing and waning  Context: not with loss of consciousness    Context comment:  Pt reports that she has had prolonged heat exposure in her home.  Relieved by:  Nothing  Worsened by:  Nothing  Associated symptoms: nausea and weakness (generalized; not focal)    Associated symptoms: no chest pain, no diarrhea, no headaches, no palpitations, no shortness of breath, no syncope, no vision changes and no vomiting          PAST MEDICAL HISTORY  Active Ambulatory Problems     Diagnosis Date Noted   • Mild persistent asthma without complication 03/17/2016   • Bipolar affective disorder, currently active 03/17/2016   • Migraine without aura and without status migrainosus, not intractable " 03/17/2016   • Chronic tension-type headache, not intractable 03/17/2016   • Irritable bowel syndrome with both constipation and diarrhea 03/17/2016   • Anxiety 03/17/2016   • Seborrheic dermatitis of scalp 11/16/2016   • Chronic insomnia 03/08/2017   • High risk medication use 04/28/2017   • Polyarthralgia 04/28/2017   • Seasonal allergic rhinitis 04/28/2017   • Enlarged submental lymph node 05/12/2017     Resolved Ambulatory Problems     Diagnosis Date Noted   • Major depression, chronic 03/17/2016   • Chronic anemia 03/17/2016   • Strain of lumbar paraspinal muscle 08/17/2016   • Left shoulder pain 08/17/2016   • Right upper quadrant abdominal pain 11/16/2016   • Rash/skin eruption 11/16/2016     Past Medical History:   Diagnosis Date   • Anxiety    • Arthritis    • Bipolar 1 disorder    • Cold sore    • Depression    • History of asthma    • History of iron deficiency anemia    • IBS (irritable bowel syndrome)    • Injury of back    • Migraines          PAST SURGICAL HISTORY  Past Surgical History:   Procedure Laterality Date   • COLONOSCOPY     • DENTAL PROCEDURE     • SHOULDER ARTHROSCOPY W/ LABRAL REPAIR Left 12/2016   • TUBAL COAGULATION LAPAROSCOPIC N/A 5/26/2017    Procedure: TUBAL COAGULATION LAPAROSCOPIC;  Surgeon: Horacio Echavarria MD;  Location: [facility];  Service:          FAMILY HISTORY  Family History   Problem Relation Age of Onset   • Adopted: Yes   • Bipolar disorder Mother    • Suicide Attempts Mother    • Bipolar disorder Father    • No Known Problems Sister    • No Known Problems Brother    • No Known Problems Son    • No Known Problems Daughter    • No Known Problems Maternal Grandmother    • No Known Problems Maternal Grandfather    • No Known Problems Paternal Grandmother    • No Known Problems Paternal Grandfather    • No Known Problems Cousin          SOCIAL HISTORY  Social History     Social History   • Marital status:      Spouse name: N/A   • Number of children: N/A   •  Years of education: N/A     Occupational History   • Not on file.     Social History Main Topics   • Smoking status: Never Smoker   • Smokeless tobacco: Never Used   • Alcohol use Yes      Comment: SOCIAL   • Drug use: No   • Sexual activity: Not on file     Other Topics Concern   • Not on file     Social History Narrative         ALLERGIES  Lamotrigine        REVIEW OF SYSTEMS  Review of Systems   Constitutional: Positive for fever (subjective; not documented ). Negative for chills.   HENT: Negative for congestion, rhinorrhea and sore throat.    Eyes: Negative for pain and visual disturbance.   Respiratory: Negative for cough and shortness of breath.    Cardiovascular: Negative for chest pain, palpitations, leg swelling and syncope.   Gastrointestinal: Positive for nausea. Negative for abdominal pain, diarrhea and vomiting.   Genitourinary: Negative for difficulty urinating, dysuria, flank pain and frequency.   Musculoskeletal: Negative for myalgias, neck pain and neck stiffness.   Skin: Negative for rash.   Neurological: Positive for dizziness (lightheadedness), weakness (generalized; not focal) and light-headedness. Negative for syncope, speech difficulty, numbness and headaches.   Psychiatric/Behavioral: Negative.    All other systems reviewed and are negative.           PHYSICAL EXAM  ED Triage Vitals   Temp Heart Rate Resp BP SpO2   06/15/17 1247 06/15/17 1247 06/15/17 1247 06/15/17 1249 06/15/17 1247   98 °F (36.7 °C) 82 16 114/75 100 % WNL      Temp src Heart Rate Source Patient Position BP Location FiO2 (%)   06/15/17 1247 06/15/17 1247 -- -- --   Tympanic Monitor            Physical Exam   Constitutional: She is oriented to person, place, and time. No distress.   HENT:   Head: Normocephalic.   Mouth/Throat: Mucous membranes are normal.   Eyes: EOM are normal. Pupils are equal, round, and reactive to light.   Neck: Normal range of motion. Neck supple.   Cardiovascular: Normal rate, regular rhythm and normal  heart sounds.    Pulmonary/Chest: Effort normal and breath sounds normal. No respiratory distress. She has no decreased breath sounds. She has no wheezes. She has no rhonchi. She has no rales.   Abdominal: Soft. There is no tenderness. There is no rebound and no guarding.   Musculoskeletal: Normal range of motion.   Neurological: She is alert and oriented to person, place, and time. She has normal sensation.   Skin: Skin is warm and dry.   Psychiatric: Mood and affect normal.   Nursing note and vitals reviewed.          LAB RESULTS  Recent Results (from the past 24 hour(s))   Comprehensive Metabolic Panel    Collection Time: 06/15/17  1:00 PM   Result Value Ref Range    Glucose 85 65 - 99 mg/dL    BUN 9 6 - 20 mg/dL    Creatinine 0.86 0.57 - 1.00 mg/dL    Sodium 138 136 - 145 mmol/L    Potassium 3.9 3.5 - 5.2 mmol/L    Chloride 99 98 - 107 mmol/L    CO2 23.2 22.0 - 29.0 mmol/L    Calcium 9.9 8.6 - 10.5 mg/dL    Total Protein 7.5 6.0 - 8.5 g/dL    Albumin 4.60 3.50 - 5.20 g/dL    ALT (SGPT) 9 1 - 33 U/L    AST (SGOT) 13 1 - 32 U/L    Alkaline Phosphatase 64 39 - 117 U/L    Total Bilirubin <0.2 0.1 - 1.2 mg/dL    eGFR Non African Amer 80 >60 mL/min/1.73    Globulin 2.9 gm/dL    A/G Ratio 1.6 g/dL    BUN/Creatinine Ratio 10.5 7.0 - 25.0    Anion Gap 15.8 mmol/L   Troponin    Collection Time: 06/15/17  1:00 PM   Result Value Ref Range    Troponin T <0.010 0.000 - 0.030 ng/mL   Lithium Level    Collection Time: 06/15/17  1:00 PM   Result Value Ref Range    Lithium 1.6 (H) 0.6 - 1.2 mmol/L   hCG, Serum, Qualitative    Collection Time: 06/15/17  1:00 PM   Result Value Ref Range    HCG Qualitative Negative Indeterminate, Negative   CK    Collection Time: 06/15/17  1:00 PM   Result Value Ref Range    Creatine Kinase 57 20 - 180 U/L   CBC Auto Differential    Collection Time: 06/15/17  1:00 PM   Result Value Ref Range    WBC 7.81 4.50 - 10.70 10*3/mm3    RBC 4.57 3.90 - 5.20 10*6/mm3    Hemoglobin 13.1 11.9 - 15.5 g/dL     Hematocrit 40.1 35.6 - 45.5 %    MCV 87.7 80.5 - 98.2 fL    MCH 28.7 26.9 - 32.0 pg    MCHC 32.7 32.4 - 36.3 g/dL    RDW 13.1 (H) 11.7 - 13.0 %    RDW-SD 42.2 37.0 - 54.0 fl    MPV 9.6 6.0 - 12.0 fL    Platelets 443 140 - 500 10*3/mm3    Neutrophil % 59.5 42.7 - 76.0 %    Lymphocyte % 33.0 19.6 - 45.3 %    Monocyte % 4.6 (L) 5.0 - 12.0 %    Eosinophil % 2.3 0.3 - 6.2 %    Basophil % 0.1 0.0 - 1.5 %    Immature Grans % 0.5 0.0 - 0.5 %    Neutrophils, Absolute 4.64 1.90 - 8.10 10*3/mm3    Lymphocytes, Absolute 2.58 0.90 - 4.80 10*3/mm3    Monocytes, Absolute 0.36 0.20 - 1.20 10*3/mm3    Eosinophils, Absolute 0.18 0.00 - 0.70 10*3/mm3    Basophils, Absolute 0.01 0.00 - 0.20 10*3/mm3    Immature Grans, Absolute 0.04 (H) 0.00 - 0.03 10*3/mm3       Ordered the above labs and reviewed the results.            PROCEDURES  Procedures    EKG:           EKG time: 13:03  Rhythm/Rate: NSR rate 74  P waves and VT: Normal P waves  QRS, axis: Normal QRS   ST and T waves: Flattened T waves in III     Interpreted Contemporaneously by me, independently viewed  Unchanged compared to prior 12/16/16            PROGRESS AND CONSULTS  ED Course   Comment By Time   3:09 PM  Patient with nausea and lightheadedness.  Thinks it is heat related.  Labs and ECG are normal.  Given fluids and she feels better.  Will discharge home.  States her psychiatrist wants her lithium to run a little high (1.9). Waldo Walker MD 06/15 1510     12:53 PM: CK, blood work, lithium level, and EKG ordered for further evaluation. IV fluids ordered to hydrate pt.     3:05 PM: Rechecked pt. Pt's family is at bedside. Pt is resting comfortably and appears in no acute distress. Informed pt that her troponin is negative. Creatinine is 0.86. CK is 57. Lithium level is 1.6. Pt advised to f/u with PCP. RTER warnings given. Pt and family agree with plan for discharge.               MEDICAL DECISION MAKING      MDM  Number of Diagnoses or Management Options     Amount  and/or Complexity of Data Reviewed  Clinical lab tests: ordered and reviewed (Creatinine is 0.86. Troponin is negative. CK is 57. Lithium level is 1.6.)  Tests in the medicine section of CPT®: reviewed and ordered (EKG interpreted.   )    Patient Progress  Patient progress: stable             DIAGNOSIS  Final diagnoses:   Lightheaded   Nausea         DISPOSITION  Pt discharged.      DISCHARGE    Patient discharged in stable condition.    Reviewed implications of results, diagnosis, meds, responsibility to follow up, warning signs and symptoms of possible worsening, potential complications and reasons to return to ER.    Patient/Family voiced understanding of above instructions.    Discussed plan for discharge, as there is no emergent indication for admission.  Pt/family is agreeable and understands need for follow up and repeat testing.  Pt is aware that discharge does not mean that nothing is wrong but it indicates no emergency is present that requires admission and they must continue care with follow-up as given below or physician of their choice.     FOLLOW-UP  James Epley, APRN  [address] [facility] [address] [phone]    Schedule an appointment as soon as possible for a visit           Medication List      Stop          HYDROcodone-acetaminophen 5-325 MG per tablet   Commonly known as:  NORCO                       Latest Documented Vital Signs:  As of 4:09 PM  BP- 112/69 HR- 81 Temp- 98 °F (36.7 °C) O2 sat- 98%        --  Documentation assistance provided by jamie Heaton for Dr. Aaron MD.  Information recorded by the scribe was done at my direction and has been verified and validated by me.            Sydney Heaton  06/15/17 9557       Waldo Walker MD  06/15/17 0066

## 2017-06-16 ENCOUNTER — TELEPHONE (OUTPATIENT)
Dept: SOCIAL WORK | Facility: HOSPITAL | Age: 27
End: 2017-06-16

## 2017-06-16 NOTE — TELEPHONE ENCOUNTER
ED f/u phone call. States she has some soreness and continues to have some dizziness. Reminded to make f/u eran't w/ PCP. NO questions/concerns

## 2017-06-21 ENCOUNTER — OFFICE VISIT (OUTPATIENT)
Dept: FAMILY MEDICINE CLINIC | Facility: CLINIC | Age: 27
End: 2017-06-21

## 2017-06-21 VITALS
WEIGHT: 135 LBS | TEMPERATURE: 98.2 F | BODY MASS INDEX: 23.05 KG/M2 | HEIGHT: 64 IN | HEART RATE: 67 BPM | DIASTOLIC BLOOD PRESSURE: 64 MMHG | OXYGEN SATURATION: 98 % | SYSTOLIC BLOOD PRESSURE: 84 MMHG

## 2017-06-21 DIAGNOSIS — F41.9 ANXIETY: ICD-10-CM

## 2017-06-21 DIAGNOSIS — R53.83 FATIGUE, UNSPECIFIED TYPE: Primary | ICD-10-CM

## 2017-06-21 DIAGNOSIS — R11.0 NAUSEA: ICD-10-CM

## 2017-06-21 LAB
BUN SERPL-MCNC: 11 MG/DL (ref 6–20)
BUN/CREAT SERPL: 12.5 (ref 7–25)
CALCIUM SERPL-MCNC: 10.3 MG/DL (ref 8.6–10.5)
CHLORIDE SERPL-SCNC: 101 MMOL/L (ref 98–107)
CO2 SERPL-SCNC: 22.9 MMOL/L (ref 22–29)
CREAT SERPL-MCNC: 0.88 MG/DL (ref 0.57–1)
GLUCOSE SERPL-MCNC: 89 MG/DL (ref 65–99)
POTASSIUM SERPL-SCNC: 5.4 MMOL/L (ref 3.5–5.2)
SODIUM SERPL-SCNC: 138 MMOL/L (ref 136–145)

## 2017-06-21 PROCEDURE — 99213 OFFICE O/P EST LOW 20 MIN: CPT | Performed by: NURSE PRACTITIONER

## 2017-06-21 NOTE — PROGRESS NOTES
"Subjective   Violeta Vargas is a 26 y.o. female.     History of Present Illness   PCP James Epley, APRN.      Pt went to [facility] ED with dizziness, fatigue last week.  All lab results acceptable except for elevated lithium level.  Pt did not appear dehydrated on her labs.  But, she did receive IVF.  Pt thought she was dehydrated due to heat exposure--no a/c in her apt.  Her psych, DOUGLAS Gaming (per pt report) felt it was a false high due to dehydration and didn't make any dose adjustment.  Pt has order from psych today to recheck lithium level.  Pt still feels \"wiped out.\"  + nausea.  However, she has chronic nausea and takes antiemtics prn.       The following portions of the patient's history were reviewed and updated as appropriate: allergies, current medications, past family history, past medical history, past social history, past surgical history and problem list.    Review of Systems   Constitutional: Positive for fatigue.   Gastrointestinal: Positive for nausea. Negative for vomiting.   All other systems reviewed and are negative.      Objective   Physical Exam   Constitutional: Vital signs are normal. She appears well-developed and well-nourished.  Non-toxic appearance. She does not appear ill. No distress.   Cardiovascular: Normal rate, regular rhythm, S1 normal, S2 normal and normal heart sounds.    No murmur heard.  Pulmonary/Chest: Effort normal and breath sounds normal.   Neurological: She is alert.   Skin:   Skin turgor normal   Psychiatric: Her mood appears anxious. Her speech is rapid and/or pressured.   Nursing note and vitals reviewed.      Assessment/Plan   Violeta was seen today for follow-up.    Diagnoses and all orders for this visit:    Fatigue, unspecified type  -     Basic Metabolic Panel    Nausea  -     Basic Metabolic Panel    Anxiety      Await BMP results.  F/u with her pcp and psych.           "

## 2017-06-22 ENCOUNTER — TELEPHONE (OUTPATIENT)
Dept: FAMILY MEDICINE CLINIC | Facility: CLINIC | Age: 27
End: 2017-06-22

## 2017-06-22 NOTE — PROGRESS NOTES
Call pt.  No evidence of dehydration.  Her potassium level was slightly elevated.  She should keep her f/u with her PCP James Epley, APRN.

## 2017-06-22 NOTE — TELEPHONE ENCOUNTER
----- Message from DOUGLAS Israel sent at 6/22/2017  3:37 PM EDT -----  Call pt.  No evidence of dehydration.  Her potassium level was slightly elevated.  She should keep her f/u with her PCP James Epley, APRN.

## 2017-06-27 ENCOUNTER — TELEPHONE (OUTPATIENT)
Dept: FAMILY MEDICINE CLINIC | Facility: CLINIC | Age: 27
End: 2017-06-27

## 2017-06-27 NOTE — TELEPHONE ENCOUNTER
PATIENT CALLED WANTING HER LITHIUM LEVEL RESULT. SHE CLAIMS SHE GOT THEM DONE LAST WEEK ON 6/21/17. I DID NOT SEE ANY. MY MANAGER DENNIES COULD NOT FIND ANY ETHIER. I TOLD PATIENT IT DID NOT GET DONE AND SHE WILL HAVE TO COME BACK IN IF SHE NEEDS THEM DONE.

## 2017-06-28 DIAGNOSIS — Z79.899 HIGH RISK MEDICATION USE: ICD-10-CM

## 2017-06-28 DIAGNOSIS — E87.5 HYPERKALEMIA: ICD-10-CM

## 2017-06-28 DIAGNOSIS — F31.12 BIPOLAR AFFECTIVE DISORDER, CURRENTLY MANIC, MODERATE (HCC): Primary | ICD-10-CM

## 2017-06-28 LAB
LITHIUM SERPL-SCNC: 1 MMOL/L (ref 0.6–1.2)
POTASSIUM SERPL-SCNC: 5 MMOL/L (ref 3.5–5.2)

## 2017-06-30 ENCOUNTER — TELEPHONE (OUTPATIENT)
Dept: FAMILY MEDICINE CLINIC | Facility: CLINIC | Age: 27
End: 2017-06-30

## 2017-06-30 NOTE — TELEPHONE ENCOUNTER
----- Message from DOUGLAS Israel sent at 6/30/2017  1:23 PM EDT -----  Call pt.  Lithium level and potassium level are wnl.  Please fax her lithium level result to her

## 2017-06-30 NOTE — TELEPHONE ENCOUNTER
LEFT DETAILED MESSAGE REGARDING RESULTS. TOLD PATIENT I WILL FAX HER RESULTS TO HER PSYCHIATRIST. TOLD HER TO CALL ME BACK AND GIVE ME THE NUMBER FOR THEM.

## 2017-08-21 ENCOUNTER — HOSPITAL ENCOUNTER (EMERGENCY)
Facility: HOSPITAL | Age: 27
Discharge: HOME | End: 2017-08-22
Attending: EMERGENCY MEDICINE | Admitting: EMERGENCY MEDICINE

## 2017-08-21 DIAGNOSIS — R55 SYNCOPE AND COLLAPSE: Primary | ICD-10-CM

## 2017-08-21 DIAGNOSIS — R51.9 ACUTE NONINTRACTABLE HEADACHE, UNSPECIFIED HEADACHE TYPE: ICD-10-CM

## 2017-08-21 DIAGNOSIS — R53.1 GENERALIZED WEAKNESS: ICD-10-CM

## 2017-08-21 LAB
ALBUMIN SERPL-MCNC: 4.3 G/DL (ref 3.5–5.2)
ALBUMIN/GLOB SERPL: 1.5 G/DL
ALP SERPL-CCNC: 61 U/L (ref 39–117)
ALT SERPL W P-5'-P-CCNC: 7 U/L (ref 1–33)
ANION GAP SERPL CALCULATED.3IONS-SCNC: 10.4 MMOL/L
AST SERPL-CCNC: 14 U/L (ref 1–32)
BACTERIA UR QL AUTO: ABNORMAL /HPF
BASOPHILS # BLD AUTO: 0.01 10*3/MM3 (ref 0–0.2)
BASOPHILS NFR BLD AUTO: 0.1 % (ref 0–1.5)
BILIRUB SERPL-MCNC: <0.2 MG/DL (ref 0.1–1.2)
BILIRUB UR QL STRIP: NEGATIVE
BUN BLD-MCNC: 11 MG/DL (ref 6–20)
BUN/CREAT SERPL: 11.8 (ref 7–25)
CALCIUM SPEC-SCNC: 9.7 MG/DL (ref 8.6–10.5)
CHLORIDE SERPL-SCNC: 103 MMOL/L (ref 98–107)
CLARITY UR: CLEAR
CO2 SERPL-SCNC: 23.6 MMOL/L (ref 22–29)
COLOR UR: YELLOW
CREAT BLD-MCNC: 0.93 MG/DL (ref 0.57–1)
DEPRECATED RDW RBC AUTO: 43 FL (ref 37–54)
EOSINOPHIL # BLD AUTO: 0.26 10*3/MM3 (ref 0–0.7)
EOSINOPHIL NFR BLD AUTO: 3.7 % (ref 0.3–6.2)
ERYTHROCYTE [DISTWIDTH] IN BLOOD BY AUTOMATED COUNT: 13.2 % (ref 11.7–13)
GFR SERPL CREATININE-BSD FRML MDRD: 73 ML/MIN/1.73
GLOBULIN UR ELPH-MCNC: 2.8 GM/DL
GLUCOSE BLD-MCNC: 98 MG/DL (ref 65–99)
GLUCOSE BLDC GLUCOMTR-MCNC: 90 MG/DL (ref 70–130)
GLUCOSE UR STRIP-MCNC: NEGATIVE MG/DL
HCG SERPL QL: NEGATIVE
HCT VFR BLD AUTO: 40.6 % (ref 35.6–45.5)
HGB BLD-MCNC: 13.2 G/DL (ref 11.9–15.5)
HGB UR QL STRIP.AUTO: NEGATIVE
HYALINE CASTS UR QL AUTO: ABNORMAL /LPF
IMM GRANULOCYTES # BLD: 0.02 10*3/MM3 (ref 0–0.03)
IMM GRANULOCYTES NFR BLD: 0.3 % (ref 0–0.5)
KETONES UR QL STRIP: NEGATIVE
LEUKOCYTE ESTERASE UR QL STRIP.AUTO: ABNORMAL
LITHIUM SERPL-SCNC: 0.6 MMOL/L (ref 0.6–1.2)
LYMPHOCYTES # BLD AUTO: 3.36 10*3/MM3 (ref 0.9–4.8)
LYMPHOCYTES NFR BLD AUTO: 48.4 % (ref 19.6–45.3)
MCH RBC QN AUTO: 29.3 PG (ref 26.9–32)
MCHC RBC AUTO-ENTMCNC: 32.5 G/DL (ref 32.4–36.3)
MCV RBC AUTO: 90 FL (ref 80.5–98.2)
MONOCYTES # BLD AUTO: 0.46 10*3/MM3 (ref 0.2–1.2)
MONOCYTES NFR BLD AUTO: 6.6 % (ref 5–12)
NEUTROPHILS # BLD AUTO: 2.83 10*3/MM3 (ref 1.9–8.1)
NEUTROPHILS NFR BLD AUTO: 40.9 % (ref 42.7–76)
NITRITE UR QL STRIP: NEGATIVE
PH UR STRIP.AUTO: 7 [PH] (ref 5–8)
PLATELET # BLD AUTO: 429 10*3/MM3 (ref 140–500)
PMV BLD AUTO: 9.5 FL (ref 6–12)
POTASSIUM BLD-SCNC: 4.3 MMOL/L (ref 3.5–5.2)
PROT SERPL-MCNC: 7.1 G/DL (ref 6–8.5)
PROT UR QL STRIP: NEGATIVE
RBC # BLD AUTO: 4.51 10*6/MM3 (ref 3.9–5.2)
RBC # UR: ABNORMAL /HPF
REF LAB TEST METHOD: ABNORMAL
SODIUM BLD-SCNC: 137 MMOL/L (ref 136–145)
SP GR UR STRIP: 1.02 (ref 1–1.03)
SQUAMOUS #/AREA URNS HPF: ABNORMAL /HPF
UROBILINOGEN UR QL STRIP: ABNORMAL
WBC NRBC COR # BLD: 6.94 10*3/MM3 (ref 4.5–10.7)
WBC UR QL AUTO: ABNORMAL /HPF

## 2017-08-21 PROCEDURE — 80053 COMPREHEN METABOLIC PANEL: CPT | Performed by: EMERGENCY MEDICINE

## 2017-08-21 PROCEDURE — 85025 COMPLETE CBC W/AUTO DIFF WBC: CPT | Performed by: EMERGENCY MEDICINE

## 2017-08-21 PROCEDURE — 93010 ELECTROCARDIOGRAM REPORT: CPT | Performed by: INTERNAL MEDICINE

## 2017-08-21 PROCEDURE — 93005 ELECTROCARDIOGRAM TRACING: CPT | Performed by: EMERGENCY MEDICINE

## 2017-08-21 PROCEDURE — 80178 ASSAY OF LITHIUM: CPT | Performed by: EMERGENCY MEDICINE

## 2017-08-21 PROCEDURE — 84703 CHORIONIC GONADOTROPIN ASSAY: CPT | Performed by: EMERGENCY MEDICINE

## 2017-08-21 PROCEDURE — 87086 URINE CULTURE/COLONY COUNT: CPT | Performed by: EMERGENCY MEDICINE

## 2017-08-21 PROCEDURE — 81001 URINALYSIS AUTO W/SCOPE: CPT | Performed by: EMERGENCY MEDICINE

## 2017-08-21 PROCEDURE — 99284 EMERGENCY DEPT VISIT MOD MDM: CPT

## 2017-08-21 PROCEDURE — 82962 GLUCOSE BLOOD TEST: CPT

## 2017-08-21 RX ORDER — SODIUM CHLORIDE 0.9 % (FLUSH) 0.9 %
10 SYRINGE (ML) INJECTION AS NEEDED
Status: DISCONTINUED | OUTPATIENT
Start: 2017-08-21 | End: 2017-08-22 | Stop reason: HOSPADM

## 2017-08-22 VITALS
WEIGHT: 141 LBS | HEART RATE: 70 BPM | TEMPERATURE: 98.3 F | OXYGEN SATURATION: 100 % | RESPIRATION RATE: 16 BRPM | HEIGHT: 64 IN | DIASTOLIC BLOOD PRESSURE: 80 MMHG | SYSTOLIC BLOOD PRESSURE: 109 MMHG | BODY MASS INDEX: 24.07 KG/M2

## 2017-08-22 LAB
HOLD SPECIMEN: NORMAL
HOLD SPECIMEN: NORMAL
WHOLE BLOOD HOLD SPECIMEN: NORMAL
WHOLE BLOOD HOLD SPECIMEN: NORMAL

## 2017-08-22 PROCEDURE — 96374 THER/PROPH/DIAG INJ IV PUSH: CPT

## 2017-08-22 PROCEDURE — 96361 HYDRATE IV INFUSION ADD-ON: CPT

## 2017-08-22 PROCEDURE — 25010000002 KETOROLAC TROMETHAMINE PER 15 MG: Performed by: EMERGENCY MEDICINE

## 2017-08-22 PROCEDURE — 96375 TX/PRO/DX INJ NEW DRUG ADDON: CPT

## 2017-08-22 PROCEDURE — 25010000002 ONDANSETRON PER 1 MG: Performed by: EMERGENCY MEDICINE

## 2017-08-22 RX ORDER — KETOROLAC TROMETHAMINE 30 MG/ML
15 INJECTION, SOLUTION INTRAMUSCULAR; INTRAVENOUS ONCE
Status: COMPLETED | OUTPATIENT
Start: 2017-08-22 | End: 2017-08-22

## 2017-08-22 RX ORDER — ONDANSETRON 2 MG/ML
4 INJECTION INTRAMUSCULAR; INTRAVENOUS ONCE
Status: COMPLETED | OUTPATIENT
Start: 2017-08-22 | End: 2017-08-22

## 2017-08-22 RX ADMIN — SODIUM CHLORIDE 1000 ML: 9 INJECTION, SOLUTION INTRAVENOUS at 00:32

## 2017-08-22 RX ADMIN — KETOROLAC TROMETHAMINE 15 MG: 30 INJECTION, SOLUTION INTRAMUSCULAR at 01:48

## 2017-08-22 RX ADMIN — ONDANSETRON 4 MG: 2 INJECTION INTRAMUSCULAR; INTRAVENOUS at 01:48

## 2017-08-22 NOTE — ED TRIAGE NOTES
Patient came up to triage desk states that she is having shallow breathing, also states a headache with chest pains and states she has felt she is going to pass out all day long. She also states she is weak and tired.

## 2017-08-22 NOTE — ED NOTES
Patient states she passed out at her sisters birthday party; she stated that when she came to she was clammy and experienced chest pain.      Janet Austin RN  08/21/17 9078

## 2017-08-22 NOTE — ED PROVIDER NOTES
EMERGENCY DEPARTMENT ENCOUNTER    CHIEF COMPLAINT  Chief Complaint: Lightheadedness  History given by: patient   History limited by: n/a  Room Number: 12/12  PMD: No Known Provider      HPI:  Pt is a 27 y.o. female who presents complaining of lightheadedness. Pt states that she has had a migraine for the past 3 days. Today, while at a birthday party, she became lightheaded and had a syncopal episode. Pt states that after the syncope, she felt clammy and has mild chest pressure. She denies nausea, vomiting, or diarrhea. Pt states that she has had syncopal episodes in the past.     Onset: gradual  Timing: single episode   Location: n/a  Radiation: n/a  Intensity/Severity: moderate  Progression: unchanged   Associated Symptoms: chest pressure, syncope  Aggravating Factors: none  Alleviating Factors: none  Previous Episodes: hx of syncope  Treatment before arrival: none    PAST MEDICAL HISTORY  Active Ambulatory Problems     Diagnosis Date Noted   • Mild persistent asthma without complication 03/17/2016   • Bipolar affective disorder, currently active 03/17/2016   • Migraine without aura and without status migrainosus, not intractable 03/17/2016   • Chronic tension-type headache, not intractable 03/17/2016   • Irritable bowel syndrome with both constipation and diarrhea 03/17/2016   • Anxiety 03/17/2016   • Seborrheic dermatitis of scalp 11/16/2016   • Chronic insomnia 03/08/2017   • High risk medication use 04/28/2017   • Polyarthralgia 04/28/2017   • Seasonal allergic rhinitis 04/28/2017   • Enlarged submental lymph node 05/12/2017     Resolved Ambulatory Problems     Diagnosis Date Noted   • Major depression, chronic 03/17/2016   • Chronic anemia 03/17/2016   • Strain of lumbar paraspinal muscle 08/17/2016   • Left shoulder pain 08/17/2016   • Right upper quadrant abdominal pain 11/16/2016   • Rash/skin eruption 11/16/2016     Past Medical History:   Diagnosis Date   • Anxiety    • Arthritis    • Bipolar 1 disorder     • Cold sore    • Depression    • History of asthma    • History of iron deficiency anemia    • IBS (irritable bowel syndrome)    • Injury of back    • Migraines        PAST SURGICAL HISTORY  Past Surgical History:   Procedure Laterality Date   • COLONOSCOPY     • DENTAL PROCEDURE     • SHOULDER ARTHROSCOPY W/ LABRAL REPAIR Left 12/2016   • TUBAL COAGULATION LAPAROSCOPIC N/A 5/26/2017    Procedure: TUBAL COAGULATION LAPAROSCOPIC;  Surgeon: Horacio Echavarria MD;  Location: [facility] MAIN OR;  Service:        FAMILY HISTORY  Family History   Problem Relation Age of Onset   • Adopted: Yes   • Bipolar disorder Mother    • Suicide Attempts Mother    • Bipolar disorder Father    • No Known Problems Sister    • No Known Problems Brother    • No Known Problems Son    • No Known Problems Daughter    • No Known Problems Maternal Grandmother    • No Known Problems Maternal Grandfather    • No Known Problems Paternal Grandmother    • No Known Problems Paternal Grandfather    • No Known Problems Cousin        SOCIAL HISTORY  Social History     Social History   • Marital status:      Spouse name: N/A   • Number of children: N/A   • Years of education: N/A     Occupational History   • Not on file.     Social History Main Topics   • Smoking status: Never Smoker   • Smokeless tobacco: Never Used   • Alcohol use Yes      Comment: SOCIAL   • Drug use: No   • Sexual activity: Not on file     Other Topics Concern   • Not on file     Social History Narrative       ALLERGIES  Lamotrigine    REVIEW OF SYSTEMS  Review of Systems   Constitutional: Negative for chills and fever.   HENT: Negative for sore throat.    Respiratory: Negative for cough.    Cardiovascular: Positive for chest pain (mild pressure).   Gastrointestinal: Negative for nausea and vomiting.   Genitourinary: Negative for dysuria.   Musculoskeletal: Negative for back pain.   Neurological: Positive for syncope and headaches.   Psychiatric/Behavioral: The patient is not  nervous/anxious.        PHYSICAL EXAM  ED Triage Vitals   Temp Heart Rate Resp BP SpO2   08/21/17 2054 08/21/17 2053 08/21/17 2053 08/21/17 2053 08/21/17 2053   98.3 °F (36.8 °C) 74 18 102/74 100 %      Temp src Heart Rate Source Patient Position BP Location FiO2 (%)   08/21/17 2054 08/21/17 2053 08/21/17 2053 08/21/17 2053 --   Tympanic Monitor Sitting Left arm        Physical Exam   Constitutional: She is oriented to person, place, and time and well-developed, well-nourished, and in no distress. No distress.   HENT:   Head: Normocephalic and atraumatic.   Eyes: EOM are normal. Pupils are equal, round, and reactive to light.   Neck: Normal range of motion. Neck supple.   Cardiovascular: Normal rate, regular rhythm and normal heart sounds.    Pulmonary/Chest: Effort normal and breath sounds normal. No respiratory distress.   Abdominal: Soft. There is no tenderness. There is no rebound and no guarding.   Musculoskeletal: Normal range of motion. She exhibits no edema.   Neurological: She is alert and oriented to person, place, and time.   Skin: Skin is warm and dry. No rash noted.   Psychiatric: Mood and affect normal.   Nursing note and vitals reviewed.      LAB RESULTS  Lab Results (last 24 hours)     Procedure Component Value Units Date/Time    CBC & Differential [[id number]] Collected:  08/21/17 2258    Specimen:  Blood Updated:  08/21/17 2324    Narrative:       The following orders were created for panel order CBC & Differential.  Procedure                               Abnormality         Status                     ---------                               -----------         ------                     CBC Auto Differential[[id number]]        Abnormal            Final result                 Please view results for these tests on the individual orders.    Comprehensive Metabolic Panel [[id number]] Collected:  08/21/17 2258    Specimen:  Blood Updated:  08/21/17 2343     Glucose 98 mg/dL      BUN 11 mg/dL       Creatinine 0.93 mg/dL      Sodium 137 mmol/L      Potassium 4.3 mmol/L      Chloride 103 mmol/L      CO2 23.6 mmol/L      Calcium 9.7 mg/dL      Total Protein 7.1 g/dL      Albumin 4.30 g/dL      ALT (SGPT) 7 U/L      AST (SGOT) 14 U/L      Alkaline Phosphatase 61 U/L      Total Bilirubin <0.2 mg/dL      eGFR Non African Amer 73 mL/min/1.73      Globulin 2.8 gm/dL      A/G Ratio 1.5 g/dL      BUN/Creatinine Ratio 11.8     Anion Gap 10.4 mmol/L     hCG, Serum, Qualitative [[id number]]  (Normal) Collected:  08/21/17 2258    Specimen:  Blood Updated:  08/21/17 2338     HCG Qualitative Negative    Lithium Level [[id number]]  (Normal) Collected:  08/21/17 2258    Specimen:  Blood Updated:  08/21/17 2336     Lithium 0.6 mmol/L     CBC Auto Differential [[id number]]  (Abnormal) Collected:  08/21/17 2258    Specimen:  Blood Updated:  08/21/17 2324     WBC 6.94 10*3/mm3      RBC 4.51 10*6/mm3      Hemoglobin 13.2 g/dL      Hematocrit 40.6 %      MCV 90.0 fL      MCH 29.3 pg      MCHC 32.5 g/dL      RDW 13.2 (H) %      RDW-SD 43.0 fl      MPV 9.5 fL      Platelets 429 10*3/mm3      Neutrophil % 40.9 (L) %      Lymphocyte % 48.4 (H) %      Monocyte % 6.6 %      Eosinophil % 3.7 %      Basophil % 0.1 %      Immature Grans % 0.3 %      Neutrophils, Absolute 2.83 10*3/mm3      Lymphocytes, Absolute 3.36 10*3/mm3      Monocytes, Absolute 0.46 10*3/mm3      Eosinophils, Absolute 0.26 10*3/mm3      Basophils, Absolute 0.01 10*3/mm3      Immature Grans, Absolute 0.02 10*3/mm3     Urinalysis With / Culture If Indicated [[id number]]  (Abnormal) Collected:  08/21/17 2315    Specimen:  Urine from Urine, Clean Catch Updated:  08/21/17 2341     Color, UA Yellow     Appearance, UA Clear     pH, UA 7.0     Specific Gravity, UA 1.016     Glucose, UA Negative     Ketones, UA Negative     Bilirubin, UA Negative     Blood, UA Negative     Protein, UA Negative     Leuk Esterase, UA Small (1+) (A)     Nitrite, UA Negative     Urobilinogen, UA 0.2  E.U./dL    Urinalysis, Microscopic Only [[id number]]  (Abnormal) Collected:  08/21/17 2315    Specimen:  Urine from Urine, Clean Catch Updated:  08/21/17 2341     RBC, UA 3-5 (A) /HPF      WBC, UA 6-12 (A) /HPF      Bacteria, UA 1+ (A) /HPF      Squamous Epithelial Cells, UA 0-2 /HPF      Hyaline Casts, UA 0-2 /LPF      Methodology Automated Microscopy    Urine Culture [[id number]] Collected:  08/21/17 2315    Specimen:  Urine from Urine, Clean Catch Updated:  08/21/17 2341    POC Glucose Fingerstick [[id number]]  (Normal) Collected:  08/21/17 2321    Specimen:  Blood Updated:  08/21/17 2325     Glucose 90 mg/dL     Narrative:       Meter: [id number] : [id number] [place]  TECH          I ordered the above labs and reviewed the results    PROCEDURES  Procedures    EKG           EKG time: 21:12  Rhythm/Rate: NSR 66  P waves and NV: nml  QRS, axis: nml   ST and T waves: nml  Normal QT interval     Interpreted Contemporaneously by me, independently viewed  Unchanged compared to prior 6/2017      PROGRESS AND CONSULTS  ED Course     22:52  Lithium level, hcg, UA, CMP, CBC, POC glucose, and EKG ordered for further evaluation.     23:35  IVF ordered for hydration.    01:28  BP- 115/84 HR- 69 Temp- 98.3 °F (36.8 °C) (Tympanic) O2 sat- 100%  Rechecked the patient who is in NAD and is resting comfortably. Advised pt that the EKG and labs show NAD. Will treat headache and nausea. Pt will be discharged. Pt understands and agrees with the plan, all questions answered.    01;35  Toradol and Zofran ordered to treat headache and nausea.     MEDICAL DECISION MAKING  Results were reviewed/discussed with the patient and they were also made aware of online access. Pt also made aware that some labs, such as cultures, will not be resulted during ER visit and follow up with PMD is necessary.     MDM  Number of Diagnoses or Management Options     Amount and/or Complexity of Data Reviewed  Clinical lab tests: ordered and  reviewed (Lithium is 0.6)  Tests in the medicine section of CPT®: ordered and reviewed (See EKG procedure note. )  Decide to obtain previous medical records or to obtain history from someone other than the patient: yes  Review and summarize past medical records: yes (Pt was last seen here in 6/2017 secondary to nausea and lightheadedness. )    Patient Progress  Patient progress: stable         DIAGNOSIS  Final diagnoses:   Syncope and collapse   Generalized weakness   Acute nonintractable headache, unspecified headache type       DISPOSITION  DISCHARGE    Patient discharged in stable condition.    Reviewed implications of results, diagnosis, meds, responsibility to follow up, warning signs and symptoms of possible worsening, potential complications and reasons to return to ER.    Patient/Family voiced understanding of above instructions.    Discussed plan for discharge, as there is no emergent indication for admission.  Pt/family is agreeable and understands need for follow up and repeat testing.  Pt is aware that discharge does not mean that nothing is wrong but it indicates no emergency is present that requires admission and they must continue care with follow-up as given below or physician of their choice.     FOLLOW-UP  [facility] REFERRAL SERVICE  [facility] [address] [phone]  Schedule an appointment as soon as possible for a visit           Medication List      Changed          JUNEL 1/20 1-20 MG-MCG per tablet   Generic drug:  norethindrone-ethinyl estradiol   Take 1 tablet by mouth Daily.   What changed:  when to take this         Stop          HYDROcodone-acetaminophen 5-325 MG per tablet   Commonly known as:  NORCO       vitamin B-12 1000 MCG tablet   Commonly known as:  CYANOCOBALAMIN           Latest Documented Vital Signs:  As of 6:11 AM  BP- 109/80 HR- 70 Temp- 98.3 °F (36.8 °C) (Oral) O2 sat- 100%    --  Documentation assistance provided by jamie Ching for  Dr Cheung.  Information recorded by the scribe was done at my direction and has been verified and validated by me.        Sapphire Ching  08/22/17 0152       Joshua Cheung MD  08/22/17 0621

## 2017-08-23 LAB — BACTERIA SPEC AEROBE CULT: NORMAL

## 2018-01-02 ENCOUNTER — OFFICE VISIT (OUTPATIENT)
Dept: INTERNAL MEDICINE | Facility: CLINIC | Age: 28
End: 2018-01-02

## 2018-01-02 VITALS
SYSTOLIC BLOOD PRESSURE: 90 MMHG | WEIGHT: 136.8 LBS | HEART RATE: 73 BPM | DIASTOLIC BLOOD PRESSURE: 60 MMHG | BODY MASS INDEX: 23.35 KG/M2 | HEIGHT: 64 IN | OXYGEN SATURATION: 100 %

## 2018-01-02 DIAGNOSIS — R53.1 WEAKNESS: ICD-10-CM

## 2018-01-02 DIAGNOSIS — F31.12 BIPOLAR AFFECTIVE DISORDER, CURRENTLY MANIC, MODERATE (HCC): ICD-10-CM

## 2018-01-02 DIAGNOSIS — F51.04 CHRONIC INSOMNIA: ICD-10-CM

## 2018-01-02 DIAGNOSIS — R55 POSTURAL DIZZINESS WITH PRESYNCOPE: Primary | ICD-10-CM

## 2018-01-02 DIAGNOSIS — R42 POSTURAL DIZZINESS WITH PRESYNCOPE: Primary | ICD-10-CM

## 2018-01-02 PROCEDURE — 99213 OFFICE O/P EST LOW 20 MIN: CPT | Performed by: NURSE PRACTITIONER

## 2018-01-02 RX ORDER — BUSPIRONE HYDROCHLORIDE 10 MG/1
10 TABLET ORAL 3 TIMES DAILY
COMMUNITY
End: 2018-01-02 | Stop reason: SDUPTHER

## 2018-01-02 RX ORDER — TRAZODONE HYDROCHLORIDE 50 MG/1
50 TABLET ORAL NIGHTLY
Qty: 30 TABLET | Refills: 1 | Status: SHIPPED | OUTPATIENT
Start: 2018-01-02 | End: 2018-02-19 | Stop reason: SDUPTHER

## 2018-01-02 RX ORDER — BUSPIRONE HYDROCHLORIDE 10 MG/1
10 TABLET ORAL
Qty: 60 TABLET | Refills: 1 | Status: SHIPPED | OUTPATIENT
Start: 2018-01-02 | End: 2018-02-19 | Stop reason: SDUPTHER

## 2018-01-02 RX ORDER — LITHIUM CARBONATE 300 MG
600 TABLET ORAL
Qty: 120 TABLET | Refills: 1 | Status: SHIPPED | OUTPATIENT
Start: 2018-01-02 | End: 2018-02-19 | Stop reason: SDUPTHER

## 2018-01-02 RX ORDER — TRAZODONE HYDROCHLORIDE 50 MG/1
50 TABLET ORAL NIGHTLY
COMMUNITY
End: 2018-01-02 | Stop reason: SDUPTHER

## 2018-01-03 NOTE — PROGRESS NOTES
Subjective   Violeta Vargas is a 27 y.o. female who presents for f/u regarding lightheadedness.    HPI Comments: She presents due to recurrent episodes of feeling lightheaded and off balance. She was seen at  [facility] 12/30/17 and CT head did not show any acute abnormalities, labs were normal. She reports this has been a chronic problem, has seen neurology (Dr. Rendon) in the past with normal EEGs. She also has had an echo 10/2/10 which she states was normal.  She has low blood pressure, reports drinking frequently in an attempt to stay hydrated.    Dizziness   This is a recurrent problem. The current episode started more than 1 month ago. The problem occurs daily. The problem has been waxing and waning. Associated symptoms include fatigue and weakness. Pertinent negatives include no abdominal pain, arthralgias, change in bowel habit, chest pain, chills, congestion, coughing, fever, headaches, joint swelling, nausea, sore throat, vertigo or vomiting. The symptoms are aggravated by walking.        The following portions of the patient's history were reviewed and updated as appropriate: allergies, current medications, past social history and problem list.    Past Medical History:   Diagnosis Date   • Anxiety    • Arthritis    • Bipolar 1 disorder    • Cold sore    • Depression    • History of asthma     ALLERGY RELATED - NO INHALERS   • History of iron deficiency anemia    • IBS (irritable bowel syndrome)    • Injury of back    • Migraines          Current Outpatient Prescriptions:   •  acetaminophen (TYLENOL) 500 MG tablet, Take 1,000 mg by mouth Every 6 (Six) Hours As Needed for Mild Pain (1-3)., Disp: , Rfl:   •  busPIRone (BUSPAR) 10 MG tablet, Take 1 tablet by mouth 2 (Two) Times a Day., Disp: 60 tablet, Rfl: 1  •  ibuprofen (ADVIL,MOTRIN) 800 MG tablet, Take 800 mg by mouth Every 6 (Six) Hours As Needed for Mild Pain (1-3)., Disp: , Rfl:   •  lithium 300 MG tablet, Take 2 tablets by mouth 2 (Two) Times a Day., Disp:  "120 tablet, Rfl: 1  •  traZODone (DESYREL) 50 MG tablet, Take 1 tablet by mouth Every Night., Disp: 30 tablet, Rfl: 1    Allergies   Allergen Reactions   • Lamotrigine Rash       Review of Systems   Constitutional: Positive for fatigue. Negative for chills, fever and unexpected weight change.   HENT: Negative for congestion, ear pain, postnasal drip, sinus pressure, sore throat and trouble swallowing.    Eyes: Negative for visual disturbance.   Respiratory: Negative for cough, chest tightness and wheezing.    Cardiovascular: Negative for chest pain, palpitations and leg swelling.   Gastrointestinal: Negative for abdominal pain, blood in stool, change in bowel habit, nausea and vomiting.   Endocrine: Negative for cold intolerance and heat intolerance.   Genitourinary: Negative for dysuria, frequency and urgency.   Musculoskeletal: Negative for arthralgias and joint swelling.   Skin: Negative for color change.   Allergic/Immunologic: Negative for immunocompromised state.   Neurological: Positive for dizziness and weakness. Negative for vertigo, syncope and headaches.   Hematological: Does not bruise/bleed easily.       Objective   Vitals:    01/02/18 1600   BP: 90/60   BP Location: Left arm   Patient Position: Sitting   Cuff Size: Adult   Pulse: 73   SpO2: 100%   Weight: 62.1 kg (136 lb 12.8 oz)   Height: 162.6 cm (64\")     Physical Exam   Constitutional: She appears well-developed and well-nourished. She is cooperative. She does not have a sickly appearance. She does not appear ill.   Gait slow and cautious   HENT:   Head: Normocephalic.   Right Ear: Hearing, tympanic membrane and external ear normal. No drainage, swelling or tenderness. No mastoid tenderness. Tympanic membrane is not injected, not scarred, not erythematous and not bulging. Tympanic membrane mobility is normal. No middle ear effusion. No decreased hearing is noted.   Left Ear: Hearing, tympanic membrane and external ear normal.   Nose: Nose normal. " No mucosal edema, rhinorrhea, sinus tenderness or nasal deformity. Right sinus exhibits no maxillary sinus tenderness and no frontal sinus tenderness. Left sinus exhibits no maxillary sinus tenderness and no frontal sinus tenderness.   Mouth/Throat: Oropharynx is clear and moist and mucous membranes are normal. Normal dentition.   Eyes: Conjunctivae and lids are normal. Pupils are equal, round, and reactive to light. Right eye exhibits no discharge and no exudate. Left eye exhibits no discharge and no exudate.   Neck: Trachea normal and normal range of motion. Carotid bruit is not present. No edema present. No thyroid mass and no thyromegaly present.   Cardiovascular: Regular rhythm, normal heart sounds and normal pulses.    No murmur heard.  Pulmonary/Chest: Breath sounds normal. No respiratory distress. She has no decreased breath sounds. She has no wheezes. She has no rhonchi. She has no rales.   Lymphadenopathy:        Head (right side): No submental, no submandibular, no tonsillar, no preauricular, no posterior auricular and no occipital adenopathy present.        Head (left side): No submental, no submandibular, no tonsillar, no preauricular, no posterior auricular and no occipital adenopathy present.   Neurological: She is alert.   Skin: Skin is warm, dry and intact. No cyanosis. Nails show no clubbing.       Assessment/Plan   Violeta was seen today for dizziness.    Diagnoses and all orders for this visit:    Postural dizziness with presyncope  Comments:  due to low BP? consider echo/holter monitor but will discuss with Dr. Pierson first    Weakness    Chronic insomnia  Comments:  doing well with Trazodone  Orders:  -     traZODone (DESYREL) 50 MG tablet; Take 1 tablet by mouth Every Night.    Bipolar affective disorder, currently manic, moderate  -     busPIRone (BUSPAR) 10 MG tablet; Take 1 tablet by mouth 2 (Two) Times a Day.  -     lithium 300 MG tablet; Take 2 tablets by mouth 2 (Two) Times a  Day.    Reviewed labs and CT scan from  ER which did not show any acute abnormalities.

## 2018-01-16 ENCOUNTER — TELEPHONE (OUTPATIENT)
Dept: INTERNAL MEDICINE | Facility: CLINIC | Age: 28
End: 2018-01-16

## 2018-01-16 NOTE — TELEPHONE ENCOUNTER
Called patient and discussed recent notes from  which did not show any acute abnormalities, she is feeling better other than recurrent lightheadedness. She is not sleeping well. Advised to continue to monitor sx and f/u if they persist/worsen.

## 2018-02-01 ENCOUNTER — OFFICE VISIT (OUTPATIENT)
Dept: OBSTETRICS AND GYNECOLOGY | Facility: CLINIC | Age: 28
End: 2018-02-01

## 2018-02-01 VITALS — SYSTOLIC BLOOD PRESSURE: 118 MMHG | DIASTOLIC BLOOD PRESSURE: 60 MMHG

## 2018-02-01 DIAGNOSIS — Z01.419 WELL WOMAN EXAM WITH ROUTINE GYNECOLOGICAL EXAM: ICD-10-CM

## 2018-02-01 DIAGNOSIS — R39.9 UTI SYMPTOMS: Primary | ICD-10-CM

## 2018-02-01 LAB
BILIRUB BLD-MCNC: NEGATIVE MG/DL
GLUCOSE UR STRIP-MCNC: NEGATIVE MG/DL
KETONES UR QL: NEGATIVE
LEUKOCYTE EST, POC: NEGATIVE
NITRITE UR-MCNC: NEGATIVE MG/ML
PH UR: 7.5 [PH] (ref 5–8)
PROT UR STRIP-MCNC: NEGATIVE MG/DL
RBC # UR STRIP: NEGATIVE /UL
SP GR UR: 1.02 (ref 1–1.03)
UROBILINOGEN UR QL: NORMAL

## 2018-02-01 PROCEDURE — 81002 URINALYSIS NONAUTO W/O SCOPE: CPT | Performed by: OBSTETRICS & GYNECOLOGY

## 2018-02-01 PROCEDURE — 99395 PREV VISIT EST AGE 18-39: CPT | Performed by: OBSTETRICS & GYNECOLOGY

## 2018-02-01 RX ORDER — NORETHINDRONE ACETATE AND ETHINYL ESTRADIOL 1; .02 MG/1; MG/1
1 TABLET ORAL DAILY
Qty: 21 TABLET | Refills: 12 | Status: SHIPPED | OUTPATIENT
Start: 2018-02-01 | End: 2018-12-14 | Stop reason: SDUPTHER

## 2018-02-01 NOTE — PROGRESS NOTES
Subjective   Violeta Vargas is a 27 y.o. female is here today as a self referral for annual.    History of Present Illness-here today for annual exam and checkup.  She is also having some nonspecific symptoms that she thought might be a urinary tract infection but her urinalysis was negative.  She also would like to get back on her birth control pills just for control of her menstrual cycles.    The following portions of the patient's history were reviewed and updated as appropriate: allergies, current medications, past family history, past medical history, past social history, past surgical history and problem list.    Review of Systems   Constitutional: Negative.    HENT: Negative.    Eyes: Negative.    Respiratory: Negative.    Cardiovascular: Negative.    Gastrointestinal: Negative.    Endocrine: Negative.    Genitourinary: Negative.    Musculoskeletal: Negative.    Skin: Negative.    Allergic/Immunologic: Negative.    Neurological: Negative.    Hematological: Negative.    Psychiatric/Behavioral: Negative.        Objective   Physical Exam   Constitutional: She is oriented to person, place, and time. She appears well-developed and well-nourished.   HENT:   Head: Normocephalic and atraumatic.   Nose: Nose normal.   Eyes: Conjunctivae and EOM are normal. Pupils are equal, round, and reactive to light.   Neck: Normal range of motion. Neck supple. No thyromegaly present.   Cardiovascular: Normal rate, regular rhythm, normal heart sounds and intact distal pulses.  Exam reveals no gallop.    No murmur heard.  Pulmonary/Chest: Effort normal and breath sounds normal. No respiratory distress. She has no wheezes. She exhibits no mass, no tenderness, no swelling and no retraction. Right breast exhibits no inverted nipple, no mass, no nipple discharge, no skin change and no tenderness. Left breast exhibits no inverted nipple, no mass, no nipple discharge, no skin change and no tenderness.   Abdominal: Soft. Bowel sounds are  normal. She exhibits no distension and no mass. There is no tenderness.   Genitourinary: Rectum normal, vagina normal and uterus normal. There is no rash, tenderness, lesion or injury on the right labia. There is no rash, tenderness, lesion or injury on the left labia. Uterus is not enlarged and not tender. Cervix exhibits no motion tenderness and no discharge. Right adnexum displays no mass, no tenderness and no fullness. Left adnexum displays no mass, no tenderness and no fullness.   Musculoskeletal: Normal range of motion. She exhibits no edema, tenderness or deformity.   Neurological: She is alert and oriented to person, place, and time.   Skin: Skin is warm and dry.   Psychiatric: She has a normal mood and affect. Her behavior is normal. Judgment and thought content normal.   Nursing note and vitals reviewed.        Assessment/Plan   Problems Addressed this Visit     None      Visit Diagnoses     UTI symptoms    -  Primary    Relevant Orders    POC Urinalysis Dipstick (Completed)    Well woman exam with routine gynecological exam        Relevant Orders    IGP,rfx Aptima HPV All Pth - ThinPrep Vial, Cervix        Pap smear done today.  [employer] 1/20 refilled at patient's request.  Negative urinalysis findings reviewed.  Patient to consider consultation with urology if symptoms persist.

## 2018-02-05 LAB
CONV .: NORMAL
CYTOLOGIST CVX/VAG CYTO: NORMAL
CYTOLOGY CVX/VAG DOC THIN PREP: NORMAL
DX ICD CODE: NORMAL
HIV 1 & 2 AB SER-IMP: NORMAL
OTHER STN SPEC: NORMAL
PATH REPORT.FINAL DX SPEC: NORMAL
STAT OF ADQ CVX/VAG CYTO-IMP: NORMAL

## 2018-02-19 ENCOUNTER — OFFICE VISIT (OUTPATIENT)
Dept: INTERNAL MEDICINE | Facility: CLINIC | Age: 28
End: 2018-02-19

## 2018-02-19 VITALS
DIASTOLIC BLOOD PRESSURE: 60 MMHG | HEIGHT: 64 IN | SYSTOLIC BLOOD PRESSURE: 98 MMHG | WEIGHT: 137 LBS | BODY MASS INDEX: 23.39 KG/M2 | RESPIRATION RATE: 14 BRPM

## 2018-02-19 DIAGNOSIS — F51.04 CHRONIC INSOMNIA: ICD-10-CM

## 2018-02-19 DIAGNOSIS — G43.009 MIGRAINE WITHOUT AURA AND WITHOUT STATUS MIGRAINOSUS, NOT INTRACTABLE: ICD-10-CM

## 2018-02-19 DIAGNOSIS — J45.30 MILD PERSISTENT ASTHMA WITHOUT COMPLICATION: ICD-10-CM

## 2018-02-19 DIAGNOSIS — F31.12 BIPOLAR AFFECTIVE DISORDER, CURRENTLY MANIC, MODERATE (HCC): Primary | ICD-10-CM

## 2018-02-19 DIAGNOSIS — F41.9 ANXIETY: ICD-10-CM

## 2018-02-19 DIAGNOSIS — F31.12 BIPOLAR AFFECTIVE DISORDER, CURRENTLY MANIC, MODERATE (HCC): ICD-10-CM

## 2018-02-19 DIAGNOSIS — K58.2 IRRITABLE BOWEL SYNDROME WITH BOTH CONSTIPATION AND DIARRHEA: ICD-10-CM

## 2018-02-19 DIAGNOSIS — K59.00 CONSTIPATION, UNSPECIFIED CONSTIPATION TYPE: Primary | ICD-10-CM

## 2018-02-19 PROBLEM — Z79.899 HIGH RISK MEDICATION USE: Status: RESOLVED | Noted: 2017-04-28 | Resolved: 2018-02-19

## 2018-02-19 PROBLEM — R59.0 ENLARGED SUBMENTAL LYMPH NODE: Status: RESOLVED | Noted: 2017-05-12 | Resolved: 2018-02-19

## 2018-02-19 PROBLEM — M25.50 POLYARTHRALGIA: Status: RESOLVED | Noted: 2017-04-28 | Resolved: 2018-02-19

## 2018-02-19 PROCEDURE — 99214 OFFICE O/P EST MOD 30 MIN: CPT | Performed by: INTERNAL MEDICINE

## 2018-02-19 RX ORDER — LITHIUM CARBONATE 300 MG
600 TABLET ORAL
Qty: 120 TABLET | Refills: 2 | Status: SHIPPED | OUTPATIENT
Start: 2018-02-19 | End: 2018-07-06 | Stop reason: SDUPTHER

## 2018-02-19 RX ORDER — ONDANSETRON 4 MG/1
TABLET, FILM COATED ORAL
Refills: 4 | COMMUNITY
Start: 2018-02-01

## 2018-02-19 RX ORDER — BUSPIRONE HYDROCHLORIDE 10 MG/1
10 TABLET ORAL
Qty: 90 TABLET | Refills: 1 | Status: SHIPPED | OUTPATIENT
Start: 2018-02-19 | End: 2018-03-05

## 2018-02-19 RX ORDER — CLOBETASOL PROPIONATE 0.5 MG/G
AEROSOL, FOAM TOPICAL
Refills: 2 | COMMUNITY
Start: 2018-01-31

## 2018-02-19 RX ORDER — LUBIPROSTONE 8 UG/1
8 CAPSULE ORAL 2 TIMES DAILY WITH MEALS
Qty: 60 CAPSULE | Refills: 1 | Status: SHIPPED | OUTPATIENT
Start: 2018-02-19 | End: 2018-04-15 | Stop reason: SDUPTHER

## 2018-02-19 RX ORDER — TRAZODONE HYDROCHLORIDE 50 MG/1
50 TABLET ORAL NIGHTLY
Qty: 30 TABLET | Refills: 2 | Status: SHIPPED | OUTPATIENT
Start: 2018-02-19 | End: 2018-03-05

## 2018-02-19 NOTE — PROGRESS NOTES
"Eben Vargas is a 27 y.o. female.     History of Present Illness she lost her insurance last year and she has not been back for follow-up in nearly a year.  She stopped seeing a psychiatrist in September.  She continues to have difficulty with chronic anxiety and chronic insomnia.  She is on lithium 600 mg twice a day for bipolar disorder.  She was seen in the emergency room in December for syncope.  She was found to have lithium toxicity at that time.  She has had difficulty with dizziness with a waxing and waning course on a daily basis over the last 5 months.  She has difficulty falling and she has associated brief loss of consciousness.  With all the episodes she breaks out into a sweat and has dizziness and a \"gray out\" occurs as well prior to syncope.  She has chronic nausea unrelieved by Zofran.  She has not had any vomiting.  She has daily migraine with a throbbing headache over the top of the head ongoing for months but ultimately all the way back to childhood.  She was taking amitiza IBS which did appear to help that particular problem but she has been out of the medication for some time.  She denies any rectal bleeding or melanotic stool.  There has been no dysphagia.  She denies any focal paresthesias or paresis.  There has been no dysarthria.        Review of Systems   Constitutional: Negative for activity change, appetite change and fatigue.   HENT: Negative for trouble swallowing.    Respiratory: Negative for cough, chest tightness, shortness of breath and wheezing.    Cardiovascular: Negative for chest pain, palpitations and leg swelling.   Gastrointestinal: Positive for constipation, diarrhea and nausea. Negative for abdominal pain, anal bleeding, blood in stool and vomiting.   Genitourinary: Negative for dysuria, flank pain and hematuria.   Neurological: Positive for dizziness and headaches. Negative for seizures, syncope, facial asymmetry, speech difficulty and numbness. "   Psychiatric/Behavioral: Positive for dysphoric mood. The patient is nervous/anxious.        Objective   Physical Exam   Constitutional: She is oriented to person, place, and time. She appears well-developed and well-nourished. She is active. She does not appear ill.   Eyes: Conjunctivae are normal.   Fundoscopic exam:       The right eye shows no AV nicking, no exudate and no hemorrhage.        The left eye shows no AV nicking, no exudate and no hemorrhage.   Neck: Carotid bruit is not present. No thyroid mass and no thyromegaly present.   Cardiovascular: Normal rate, regular rhythm, S1 normal and S2 normal.  Exam reveals no S3 and no S4.    No murmur heard.  Pulmonary/Chest: No tachypnea. No respiratory distress. She has no decreased breath sounds. She has no wheezes. She has no rhonchi. She has no rales.   Abdominal: Soft. Normal appearance and bowel sounds are normal. She exhibits no abdominal bruit and no mass. There is no hepatosplenomegaly. There is no tenderness.       Vascular Status -  Her exam exhibits no right foot edema. Her exam exhibits no left foot edema.  Neurological: She is alert and oriented to person, place, and time. She has normal strength. She displays no tremor. No cranial nerve deficit. She displays a negative Romberg sign. Gait normal.   Reflex Scores:       Bicep reflexes are 2+ on the right side and 2+ on the left side.  Psychiatric: She has a normal mood and affect. Her speech is normal and behavior is normal. Judgment and thought content normal. Cognition and memory are normal.       Assessment/Plan December lab showed a normal CBC and CMP.    Assessment #1 chronic migraine-a long-term difficult problem which certainly may account for her nausea and dizziness #2 chronic anxiety and bipolar depression-likewise.  All of this was discussed at some length with her.    Plan #1 restart  Amitiza  8 milligrams  by mouth twice a day #2 lithium level #3 psychiatry consult #4 neurology consult.   Routine follow-up with me in 6 months.

## 2018-03-02 ENCOUNTER — LAB (OUTPATIENT)
Dept: INTERNAL MEDICINE | Facility: CLINIC | Age: 28
End: 2018-03-02

## 2018-03-02 DIAGNOSIS — F31.12 BIPOLAR AFFECTIVE DISORDER, CURRENTLY MANIC, MODERATE (HCC): ICD-10-CM

## 2018-03-02 LAB — LITHIUM SERPL-SCNC: 1.1 MMOL/L (ref 0.6–1.2)

## 2018-03-05 ENCOUNTER — OFFICE VISIT (OUTPATIENT)
Dept: INTERNAL MEDICINE | Facility: CLINIC | Age: 28
End: 2018-03-05

## 2018-03-05 VITALS
OXYGEN SATURATION: 98 % | HEIGHT: 64 IN | HEART RATE: 77 BPM | WEIGHT: 135 LBS | SYSTOLIC BLOOD PRESSURE: 90 MMHG | DIASTOLIC BLOOD PRESSURE: 62 MMHG | BODY MASS INDEX: 23.05 KG/M2

## 2018-03-05 DIAGNOSIS — J20.8 ACUTE BRONCHITIS DUE TO OTHER SPECIFIED ORGANISMS: Primary | ICD-10-CM

## 2018-03-05 PROCEDURE — 99212 OFFICE O/P EST SF 10 MIN: CPT | Performed by: INTERNAL MEDICINE

## 2018-03-05 RX ORDER — MULTIPLE VITAMINS W/ MINERALS TAB 9MG-400MCG
1 TAB ORAL DAILY
COMMUNITY
End: 2018-04-06

## 2018-03-05 RX ORDER — TRAZODONE HYDROCHLORIDE 50 MG/1
50 TABLET ORAL
COMMUNITY
End: 2018-04-17 | Stop reason: SDUPTHER

## 2018-03-05 RX ORDER — BUSPIRONE HYDROCHLORIDE 5 MG/1
15 TABLET ORAL 4 TIMES DAILY
COMMUNITY
End: 2018-08-03

## 2018-03-05 RX ORDER — CLONAZEPAM 0.5 MG/1
0.5 TABLET ORAL
COMMUNITY

## 2018-03-05 RX ORDER — LUBIPROSTONE 8 UG/1
8 CAPSULE ORAL
COMMUNITY
End: 2018-03-05

## 2018-03-05 NOTE — PROGRESS NOTES
Subjective   Violeta Vargas is a 27 y.o. female.     History of Present Illness she is here today with a 5 day history of cough with mild sputum production color unknown.  She has had clear rhinorrhea and a mild sore throat.  She has had fatigue and myalgias.  Symptoms are unchanged over a 5 day course.  She was seen at urgent care 5 days ago and told that she had a viral infection.        Review of Systems   Constitutional: Positive for activity change and fatigue. Negative for appetite change, chills and unexpected weight change.   HENT: Positive for rhinorrhea and sore throat. Negative for ear pain, postnasal drip, sinus pressure, sneezing, trouble swallowing and voice change.    Respiratory: Positive for cough. Negative for chest tightness, shortness of breath and wheezing.    Cardiovascular: Negative for chest pain and leg swelling.   Gastrointestinal: Negative for abdominal pain, diarrhea, nausea and vomiting.   Neurological: Positive for dizziness. Negative for syncope and weakness.       Objective   Physical Exam   Constitutional: She is oriented to person, place, and time. Vital signs are normal. She appears well-developed and well-nourished. She is active. She does not appear ill.   HENT:   Right Ear: Tympanic membrane, external ear and ear canal normal.   Left Ear: Tympanic membrane, external ear and ear canal normal.   Mouth/Throat: No oropharyngeal exudate, posterior oropharyngeal edema or posterior oropharyngeal erythema.   Eyes: Conjunctivae are normal.   Cardiovascular: Normal rate, regular rhythm, S1 normal and S2 normal.  Exam reveals no S3 and no S4.    No murmur heard.  Pulmonary/Chest: No tachypnea. No respiratory distress. She has no decreased breath sounds. She has no wheezes. She has no rhonchi. She has no rales.   Abdominal: Soft. Normal appearance and bowel sounds are normal. She exhibits no abdominal bruit and no mass. There is no hepatosplenomegaly. There is no tenderness.    Lymphadenopathy:     She has no cervical adenopathy.   Neurological: She is alert and oriented to person, place, and time. Gait normal.   Psychiatric: She has a normal mood and affect. Her speech is normal and behavior is normal. Judgment and thought content normal. Cognition and memory are normal.       Assessment/Plan recent lithium level I.1    Assessment #1 URI    Plan #1 Z-Paresh 5 day #2 rest, 8 glasses of fluids per day, Tylenol extra strength one 4 times a day when necessary.  She will call if symptoms do not resolve.

## 2018-04-05 PROCEDURE — 99283 EMERGENCY DEPT VISIT LOW MDM: CPT

## 2018-04-05 RX ORDER — ZONISAMIDE 100 MG/1
100 CAPSULE ORAL DAILY
COMMUNITY
End: 2018-05-30

## 2018-04-06 ENCOUNTER — HOSPITAL ENCOUNTER (EMERGENCY)
Facility: HOSPITAL | Age: 28
Discharge: HOME | End: 2018-04-06
Attending: EMERGENCY MEDICINE | Admitting: EMERGENCY MEDICINE

## 2018-04-06 VITALS
RESPIRATION RATE: 16 BRPM | DIASTOLIC BLOOD PRESSURE: 66 MMHG | BODY MASS INDEX: 23.05 KG/M2 | HEIGHT: 64 IN | HEART RATE: 78 BPM | SYSTOLIC BLOOD PRESSURE: 100 MMHG | TEMPERATURE: 98.1 F | WEIGHT: 135 LBS | OXYGEN SATURATION: 99 %

## 2018-04-06 DIAGNOSIS — M62.838 NECK MUSCLE SPASM: Primary | ICD-10-CM

## 2018-04-06 PROCEDURE — 25010000002 KETOROLAC TROMETHAMINE PER 15 MG: Performed by: EMERGENCY MEDICINE

## 2018-04-06 PROCEDURE — 96375 TX/PRO/DX INJ NEW DRUG ADDON: CPT

## 2018-04-06 PROCEDURE — 25010000002 PROCHLORPERAZINE EDISYLATE PER 10 MG: Performed by: EMERGENCY MEDICINE

## 2018-04-06 PROCEDURE — 96374 THER/PROPH/DIAG INJ IV PUSH: CPT

## 2018-04-06 PROCEDURE — 25010000002 DIPHENHYDRAMINE PER 50 MG: Performed by: EMERGENCY MEDICINE

## 2018-04-06 PROCEDURE — 25010000002 LORAZEPAM PER 2 MG: Performed by: EMERGENCY MEDICINE

## 2018-04-06 RX ORDER — LORAZEPAM 2 MG/ML
0.5 INJECTION INTRAMUSCULAR ONCE
Status: COMPLETED | OUTPATIENT
Start: 2018-04-06 | End: 2018-04-06

## 2018-04-06 RX ORDER — SODIUM CHLORIDE 0.9 % (FLUSH) 0.9 %
10 SYRINGE (ML) INJECTION AS NEEDED
Status: DISCONTINUED | OUTPATIENT
Start: 2018-04-06 | End: 2018-04-06 | Stop reason: HOSPADM

## 2018-04-06 RX ORDER — DIPHENHYDRAMINE HYDROCHLORIDE 50 MG/ML
25 INJECTION INTRAMUSCULAR; INTRAVENOUS ONCE
Status: COMPLETED | OUTPATIENT
Start: 2018-04-06 | End: 2018-04-06

## 2018-04-06 RX ORDER — CYCLOBENZAPRINE HCL 10 MG
10 TABLET ORAL 3 TIMES DAILY
Qty: 15 TABLET | Refills: 0 | Status: SHIPPED | OUTPATIENT
Start: 2018-04-06 | End: 2018-08-03

## 2018-04-06 RX ORDER — KETOROLAC TROMETHAMINE 30 MG/ML
30 INJECTION, SOLUTION INTRAMUSCULAR; INTRAVENOUS ONCE
Status: COMPLETED | OUTPATIENT
Start: 2018-04-06 | End: 2018-04-06

## 2018-04-06 RX ADMIN — LORAZEPAM 0.5 MG: 2 INJECTION INTRAMUSCULAR; INTRAVENOUS at 01:21

## 2018-04-06 RX ADMIN — DIPHENHYDRAMINE HYDROCHLORIDE 25 MG: 50 INJECTION, SOLUTION INTRAMUSCULAR; INTRAVENOUS at 01:17

## 2018-04-06 RX ADMIN — KETOROLAC TROMETHAMINE 30 MG: 30 INJECTION, SOLUTION INTRAMUSCULAR at 01:19

## 2018-04-06 RX ADMIN — PROCHLORPERAZINE EDISYLATE 10 MG: 5 INJECTION INTRAMUSCULAR; INTRAVENOUS at 01:16

## 2018-04-06 NOTE — ED PROVIDER NOTES
EMERGENCY DEPARTMENT ENCOUNTER    CHIEF COMPLAINT  Chief Complaint: neck pain  History given by: patient   History limited by: n/a  Room Number: 11/11  PMD: Alexsander Pierson Jr., MD      HPI:  Pt is a 27 y.o. female who presents complaining of neck pain that began today. Pt states that she has chronic migraines, and received trigger point injections in her neck today to treat the migraines. Pt states that she has had the current migraine for the past 3 weeks, and denies any change in the headache after the injections. Pt states that the neck pain is new, and rates it as a 9/10. Pt reports limited ROM due to pain. She denies fever, chills, or any other sx.     Duration:  1 day  Onset: gradual  Timing: constant  Location: neck pain  Radiation: none  Quality: pain  Intensity/Severity: 9/10  Progression: unchanged   Associated Symptoms: chronic migraine   Aggravating Factors: none  Alleviating Factors: none  Previous Episodes: none  Treatment before arrival: received trigger point injections today.     PAST MEDICAL HISTORY  Active Ambulatory Problems     Diagnosis Date Noted   • Mild persistent asthma without complication 03/17/2016   • Bipolar affective disorder, currently active 03/17/2016   • Migraine without aura and without status migrainosus, not intractable 03/17/2016   • Chronic tension-type headache, not intractable 03/17/2016   • Irritable bowel syndrome with both constipation and diarrhea 03/17/2016   • Anxiety 03/17/2016   • Chronic insomnia 03/08/2017   • Seasonal allergic rhinitis 04/28/2017   • Acute bronchitis due to other specified organisms 03/05/2018     Resolved Ambulatory Problems     Diagnosis Date Noted   • Major depression, chronic 03/17/2016   • Chronic anemia 03/17/2016   • Strain of lumbar paraspinal muscle 08/17/2016   • Left shoulder pain 08/17/2016   • Right upper quadrant abdominal pain 11/16/2016   • Rash/skin eruption 11/16/2016   • Seborrheic dermatitis of scalp 11/16/2016   • High risk  medication use 04/28/2017   • Polyarthralgia 04/28/2017   • Enlarged submental lymph node 05/12/2017     Past Medical History:   Diagnosis Date   • Anxiety    • Arthritis    • Bipolar 1 disorder    • Cold sore    • Depression    • History of asthma    • History of iron deficiency anemia    • IBS (irritable bowel syndrome)    • Injury of back    • Migraines        PAST SURGICAL HISTORY  Past Surgical History:   Procedure Laterality Date   • COLONOSCOPY     • DENTAL PROCEDURE     • SHOULDER ARTHROSCOPY W/ LABRAL REPAIR Left 12/2016   • TUBAL ABDOMINAL LIGATION  03/2017   • TUBAL COAGULATION LAPAROSCOPIC N/A 5/26/2017    Procedure: TUBAL COAGULATION LAPAROSCOPIC;  Surgeon: Horacio Echavarria MD;  Location: [facility] OR;  Service:        FAMILY HISTORY  Family History   Problem Relation Age of Onset   • Adopted: Yes   • Bipolar disorder Mother    • Suicide Attempts Mother    • Bipolar disorder Father    • No Known Problems Sister    • No Known Problems Brother    • No Known Problems Son    • No Known Problems Daughter    • No Known Problems Maternal Grandmother    • No Known Problems Maternal Grandfather    • No Known Problems Paternal Grandmother    • No Known Problems Paternal Grandfather    • No Known Problems Cousin        SOCIAL HISTORY  Social History     Social History   • Marital status:      Spouse name: N/A   • Number of children: N/A   • Years of education: N/A     Occupational History   • Not on file.     Social History Main Topics   • Smoking status: Never Smoker   • Smokeless tobacco: Never Used   • Alcohol use Yes      Comment: SOCIAL   • Drug use: No   • Sexual activity: Not on file     Other Topics Concern   • Not on file     Social History Narrative   • No narrative on file       ALLERGIES  Imitrex [sumatriptan] and Lamotrigine    REVIEW OF SYSTEMS  Review of Systems   Constitutional: Negative for fever.   HENT: Negative for sore throat.    Eyes: Negative.    Respiratory: Negative for cough  and shortness of breath.    Cardiovascular: Negative for chest pain.   Gastrointestinal: Negative for abdominal pain, diarrhea and vomiting.   Genitourinary: Negative for dysuria.   Musculoskeletal: Positive for neck pain.   Skin: Negative for rash.   Allergic/Immunologic: Negative.    Neurological: Positive for headaches (chronic migraine.). Negative for weakness and numbness.   Hematological: Negative.    Psychiatric/Behavioral: Negative.    All other systems reviewed and are negative.      PHYSICAL EXAM  ED Triage Vitals   Temp Heart Rate Resp BP SpO2   04/05/18 2318 04/05/18 2318 04/05/18 2318 04/05/18 2344 04/05/18 2318   98.1 °F (36.7 °C) 84 16 116/77 99 %      Temp src Heart Rate Source Patient Position BP Location FiO2 (%)   04/05/18 2318 -- 04/05/18 2344 04/05/18 2344 --   Tympanic  Sitting Right arm        Physical Exam   Constitutional: She is oriented to person, place, and time. She appears distressed (uncomfortable ).   HENT:   Head: Normocephalic and atraumatic.   Eyes: EOM are normal. Pupils are equal, round, and reactive to light.   Neck: Normal range of motion. Neck supple. Muscular tenderness (moderate posterior neck tenderness. ) present.   Cardiovascular: Normal rate, regular rhythm and normal heart sounds.    Pulmonary/Chest: Effort normal and breath sounds normal. No respiratory distress.   Abdominal: Soft. There is no tenderness. There is no rebound and no guarding.   Musculoskeletal: Normal range of motion. She exhibits no edema.   Neurological: She is alert and oriented to person, place, and time.   Skin: Skin is warm and dry. No rash noted.   Psychiatric: Mood and affect normal.   Nursing note and vitals reviewed.    PROCEDURES  Procedures      PROGRESS AND CONSULTS  ED Course     00:40  BP- 116/77 HR- 84 Temp- 98.1 °F (36.7 °C) (Tympanic) O2 sat- 99%  Advised pt of the plan to treat pain. Pt understands and agrees with the plan, all questions answered.    00:42  Benadryl, compazine,  toradol, and ativan ordered to treat pain.     01:45  BP- 116/77 HR- 84 Temp- 98.1 °F (36.7 °C) (Tympanic) O2 sat- 99%  Rechecked the patient who is in NAD and is resting comfortably. Pt reports improvement in the neck pain and headache after the pain medication. Advised pt of the plan for discharge with rx for muscle relaxer. Pt understands and agrees with the plan, all questions answered.    MEDICAL DECISION MAKING  Results were reviewed/discussed with the patient and they were also made aware of online access. Pt also made aware that some labs, such as cultures, will not be resulted during ER visit and follow up with PMD is necessary.     MDM  Number of Diagnoses or Management Options  Patient Progress  Patient progress: stable         DIAGNOSIS  Final diagnoses:   Neck muscle spasm       DISPOSITION  DISCHARGE    Patient discharged in stable condition.    Reviewed implications of results, diagnosis, meds, responsibility to follow up, warning signs and symptoms of possible worsening, potential complications and reasons to return to ER.    Patient/Family voiced understanding of above instructions.    Discussed plan for discharge, as there is no emergent indication for admission. Patient referred to primary care provider for BP management due to today's BP. Pt/family is agreeable and understands need for follow up and repeat testing.  Pt is aware that discharge does not mean that nothing is wrong but it indicates no emergency is present that requires admission and they must continue care with follow-up as given below or physician of their choice.     FOLLOW-UP  No follow-up provider specified.       Medication List      New Prescriptions    cyclobenzaprine 10 MG tablet  Commonly known as:  FLEXERIL  Take 1 tablet by mouth 3 (Three) Times a Day.        Stop    ibuprofen 800 MG tablet  Commonly known as:  ADVIL,MOTRIN     multivitamin with minerals tablet tablet          Latest Documented Vital Signs:  As of 1:48  AM  BP- 116/77 HR- 84 Temp- 98.1 °F (36.7 °C) (Tympanic) O2 sat- 99%    --  Documentation assistance provided by jamie Ching for Dr Mendez.  Information recorded by the jamie was done at my direction and has been verified and validated by me.        Sapphire Ching  04/06/18 0148       Chan Mendez MD  04/06/18 0726

## 2018-04-06 NOTE — ED TRIAGE NOTES
To ER via [facility].  C/o severe neck pain.  States had nerve block today for migraines.  Pt states pain has increased.  Head and ice at home without relief.

## 2018-04-09 ENCOUNTER — TELEPHONE (OUTPATIENT)
Dept: SOCIAL WORK | Facility: HOSPITAL | Age: 28
End: 2018-04-09

## 2018-04-15 DIAGNOSIS — K59.00 CONSTIPATION, UNSPECIFIED CONSTIPATION TYPE: ICD-10-CM

## 2018-04-16 RX ORDER — LUBIPROSTONE 8 UG/1
8 CAPSULE, GELATIN COATED ORAL 2 TIMES DAILY WITH MEALS
Qty: 60 CAPSULE | Refills: 1 | Status: SHIPPED | OUTPATIENT
Start: 2018-04-16 | End: 2018-05-14 | Stop reason: SDUPTHER

## 2018-04-17 RX ORDER — TRAZODONE HYDROCHLORIDE 50 MG/1
50 TABLET ORAL NIGHTLY
Qty: 90 TABLET | Refills: 3 | Status: SHIPPED | OUTPATIENT
Start: 2018-04-17 | End: 2018-04-25 | Stop reason: SDUPTHER

## 2018-04-25 RX ORDER — TRAZODONE HYDROCHLORIDE 50 MG/1
50 TABLET ORAL NIGHTLY
Qty: 90 TABLET | Refills: 3 | Status: SHIPPED | OUTPATIENT
Start: 2018-04-25 | End: 2018-05-30

## 2018-05-14 DIAGNOSIS — K59.00 CONSTIPATION, UNSPECIFIED CONSTIPATION TYPE: ICD-10-CM

## 2018-05-14 RX ORDER — LUBIPROSTONE 8 UG/1
8 CAPSULE ORAL 2 TIMES DAILY WITH MEALS
Qty: 60 CAPSULE | Refills: 1 | Status: SHIPPED | OUTPATIENT
Start: 2018-05-14 | End: 2018-05-14 | Stop reason: SDUPTHER

## 2018-05-14 RX ORDER — LUBIPROSTONE 8 UG/1
8 CAPSULE ORAL 2 TIMES DAILY WITH MEALS
Qty: 90 CAPSULE | Refills: 1 | Status: SHIPPED | OUTPATIENT
Start: 2018-05-14 | End: 2018-08-03

## 2018-05-30 ENCOUNTER — HOSPITAL ENCOUNTER (EMERGENCY)
Facility: HOSPITAL | Age: 28
Discharge: HOME | End: 2018-05-30
Attending: EMERGENCY MEDICINE | Admitting: EMERGENCY MEDICINE

## 2018-05-30 ENCOUNTER — TELEPHONE (OUTPATIENT)
Dept: INTERNAL MEDICINE | Facility: CLINIC | Age: 28
End: 2018-05-30

## 2018-05-30 VITALS
HEIGHT: 64 IN | SYSTOLIC BLOOD PRESSURE: 117 MMHG | HEART RATE: 76 BPM | RESPIRATION RATE: 18 BRPM | BODY MASS INDEX: 23.39 KG/M2 | WEIGHT: 137 LBS | OXYGEN SATURATION: 98 % | DIASTOLIC BLOOD PRESSURE: 87 MMHG | TEMPERATURE: 97.2 F

## 2018-05-30 DIAGNOSIS — M62.830 MUSCLE SPASM OF BACK: ICD-10-CM

## 2018-05-30 DIAGNOSIS — M62.838 MUSCLE SPASMS OF NECK: Primary | ICD-10-CM

## 2018-05-30 LAB
ALBUMIN SERPL-MCNC: 3.9 G/DL (ref 3.5–5.2)
ALBUMIN/GLOB SERPL: 1.6 G/DL
ALP SERPL-CCNC: 52 U/L (ref 39–117)
ALT SERPL W P-5'-P-CCNC: 7 U/L (ref 1–33)
ANION GAP SERPL CALCULATED.3IONS-SCNC: 13.2 MMOL/L
AST SERPL-CCNC: 12 U/L (ref 1–32)
BASOPHILS # BLD AUTO: 0.01 10*3/MM3 (ref 0–0.2)
BASOPHILS NFR BLD AUTO: 0.1 % (ref 0–1.5)
BILIRUB SERPL-MCNC: <0.2 MG/DL (ref 0.1–1.2)
BUN BLD-MCNC: 13 MG/DL (ref 6–20)
BUN/CREAT SERPL: 13.4 (ref 7–25)
CALCIUM SPEC-SCNC: 9 MG/DL (ref 8.6–10.5)
CHLORIDE SERPL-SCNC: 106 MMOL/L (ref 98–107)
CO2 SERPL-SCNC: 20.8 MMOL/L (ref 22–29)
CREAT BLD-MCNC: 0.97 MG/DL (ref 0.57–1)
DEPRECATED RDW RBC AUTO: 42.2 FL (ref 37–54)
EOSINOPHIL # BLD AUTO: 0.23 10*3/MM3 (ref 0–0.7)
EOSINOPHIL NFR BLD AUTO: 3.3 % (ref 0.3–6.2)
ERYTHROCYTE [DISTWIDTH] IN BLOOD BY AUTOMATED COUNT: 13 % (ref 11.7–13)
GFR SERPL CREATININE-BSD FRML MDRD: 69 ML/MIN/1.73
GLOBULIN UR ELPH-MCNC: 2.4 GM/DL
GLUCOSE BLD-MCNC: 86 MG/DL (ref 65–99)
HCT VFR BLD AUTO: 41.2 % (ref 35.6–45.5)
HGB BLD-MCNC: 13.2 G/DL (ref 11.9–15.5)
IMM GRANULOCYTES # BLD: 0.03 10*3/MM3 (ref 0–0.03)
IMM GRANULOCYTES NFR BLD: 0.4 % (ref 0–0.5)
LITHIUM SERPL-SCNC: 0.7 MMOL/L (ref 0.6–1.2)
LYMPHOCYTES # BLD AUTO: 2.7 10*3/MM3 (ref 0.9–4.8)
LYMPHOCYTES NFR BLD AUTO: 39 % (ref 19.6–45.3)
MCH RBC QN AUTO: 28.5 PG (ref 26.9–32)
MCHC RBC AUTO-ENTMCNC: 32 G/DL (ref 32.4–36.3)
MCV RBC AUTO: 89 FL (ref 80.5–98.2)
MONOCYTES # BLD AUTO: 0.39 10*3/MM3 (ref 0.2–1.2)
MONOCYTES NFR BLD AUTO: 5.6 % (ref 5–12)
NEUTROPHILS # BLD AUTO: 3.59 10*3/MM3 (ref 1.9–8.1)
NEUTROPHILS NFR BLD AUTO: 52 % (ref 42.7–76)
PLATELET # BLD AUTO: 396 10*3/MM3 (ref 140–500)
PMV BLD AUTO: 9.6 FL (ref 6–12)
POTASSIUM BLD-SCNC: 4.2 MMOL/L (ref 3.5–5.2)
PROT SERPL-MCNC: 6.3 G/DL (ref 6–8.5)
RBC # BLD AUTO: 4.63 10*6/MM3 (ref 3.9–5.2)
SODIUM BLD-SCNC: 140 MMOL/L (ref 136–145)
WBC NRBC COR # BLD: 6.92 10*3/MM3 (ref 4.5–10.7)

## 2018-05-30 PROCEDURE — 99283 EMERGENCY DEPT VISIT LOW MDM: CPT

## 2018-05-30 PROCEDURE — 80053 COMPREHEN METABOLIC PANEL: CPT | Performed by: EMERGENCY MEDICINE

## 2018-05-30 PROCEDURE — 80178 ASSAY OF LITHIUM: CPT | Performed by: EMERGENCY MEDICINE

## 2018-05-30 PROCEDURE — 85025 COMPLETE CBC W/AUTO DIFF WBC: CPT | Performed by: EMERGENCY MEDICINE

## 2018-05-30 RX ORDER — QUETIAPINE FUMARATE 25 MG/1
25 TABLET, FILM COATED ORAL NIGHTLY
COMMUNITY

## 2018-05-30 NOTE — TELEPHONE ENCOUNTER
May or it may not be related to the medication.  Medrol Dosepak 5 days.  Atarax 25 mg 3 times a day for 5 days.

## 2018-05-30 NOTE — TELEPHONE ENCOUNTER
----- Message from Lora Negro sent at 5/30/2018  1:22 PM EDT -----  Contact: Kaz pts .  Pt was given risteridone  .5mg   By her psychiatrist, They called the office but are having a hard time to reach them.    She took it for a few days without incident, such as her throat randomly swelling.  She stopped taking it last Thursday, And the symptoms did not go away.  Her neck at that time started to spasm.    Pt is able to breath and off and on eat.  Doesn't stay swollen, she has taken benadryl.    Please advise.  Caller# [phone]

## 2018-05-31 RX ORDER — HYDROXYZINE HYDROCHLORIDE 25 MG/1
25 TABLET, FILM COATED ORAL 3 TIMES DAILY
Qty: 15 TABLET | Refills: 0 | Status: SHIPPED | OUTPATIENT
Start: 2018-05-31 | End: 2018-06-05

## 2018-05-31 RX ORDER — METHYLPREDNISOLONE 4 MG/1
TABLET ORAL
Qty: 21 EACH | Refills: 0 | Status: SHIPPED | OUTPATIENT
Start: 2018-05-31 | End: 2018-08-03

## 2018-06-04 ENCOUNTER — TELEPHONE (OUTPATIENT)
Dept: SOCIAL WORK | Facility: HOSPITAL | Age: 28
End: 2018-06-04

## 2018-06-04 NOTE — TELEPHONE ENCOUNTER
F/u phone call; spoke w/pt who reports feeling better; Has f/u w/PCP who prescribed her steroids for her neck and is taking as prescribed. No further needs at this time. Lakia Robins RN

## 2018-06-12 ENCOUNTER — TELEPHONE (OUTPATIENT)
Dept: OBSTETRICS AND GYNECOLOGY | Facility: CLINIC | Age: 28
End: 2018-06-12

## 2018-06-12 RX ORDER — FLUCONAZOLE 150 MG/1
150 TABLET ORAL ONCE
Qty: 1 TABLET | Refills: 6 | Status: SHIPPED | OUTPATIENT
Start: 2018-06-12 | End: 2018-06-12

## 2018-07-06 DIAGNOSIS — F31.12 BIPOLAR AFFECTIVE DISORDER, CURRENTLY MANIC, MODERATE (HCC): ICD-10-CM

## 2018-07-06 RX ORDER — LITHIUM CARBONATE 300 MG
600 TABLET ORAL
Qty: 120 TABLET | Refills: 3 | Status: SHIPPED | OUTPATIENT
Start: 2018-07-06 | End: 2018-08-03

## 2018-07-06 RX ORDER — BUSPIRONE HYDROCHLORIDE 10 MG/1
10 TABLET ORAL
Qty: 90 TABLET | Refills: 3 | Status: SHIPPED | OUTPATIENT
Start: 2018-07-06 | End: 2018-08-03

## 2018-07-25 ENCOUNTER — OFFICE (AMBULATORY)
Dept: URBAN - METROPOLITAN AREA CLINIC 75 | Facility: CLINIC | Age: 28
End: 2018-07-25

## 2018-07-25 VITALS
HEIGHT: 64 IN | HEART RATE: 58 BPM | DIASTOLIC BLOOD PRESSURE: 62 MMHG | SYSTOLIC BLOOD PRESSURE: 108 MMHG | RESPIRATION RATE: 12 BRPM | WEIGHT: 147 LBS

## 2018-07-25 DIAGNOSIS — R11.0 NAUSEA: ICD-10-CM

## 2018-07-25 DIAGNOSIS — R19.4 CHANGE IN BOWEL HABIT: ICD-10-CM

## 2018-07-25 DIAGNOSIS — K62.5 HEMORRHAGE OF ANUS AND RECTUM: ICD-10-CM

## 2018-07-25 DIAGNOSIS — K59.00 CONSTIPATION, UNSPECIFIED: ICD-10-CM

## 2018-07-25 PROCEDURE — 99244 OFF/OP CNSLTJ NEW/EST MOD 40: CPT

## 2018-08-02 VITALS
HEART RATE: 86 BPM | TEMPERATURE: 99 F | DIASTOLIC BLOOD PRESSURE: 46 MMHG | HEART RATE: 74 BPM | SYSTOLIC BLOOD PRESSURE: 87 MMHG | DIASTOLIC BLOOD PRESSURE: 55 MMHG | HEART RATE: 93 BPM | RESPIRATION RATE: 22 BRPM | OXYGEN SATURATION: 99 % | HEART RATE: 122 BPM | RESPIRATION RATE: 16 BRPM | HEART RATE: 83 BPM | SYSTOLIC BLOOD PRESSURE: 93 MMHG | SYSTOLIC BLOOD PRESSURE: 94 MMHG | HEART RATE: 90 BPM | OXYGEN SATURATION: 97 % | DIASTOLIC BLOOD PRESSURE: 47 MMHG | RESPIRATION RATE: 23 BRPM | DIASTOLIC BLOOD PRESSURE: 76 MMHG | RESPIRATION RATE: 40 BRPM | RESPIRATION RATE: 36 BRPM | RESPIRATION RATE: 28 BRPM | HEART RATE: 91 BPM | DIASTOLIC BLOOD PRESSURE: 53 MMHG | HEART RATE: 104 BPM | HEART RATE: 85 BPM | RESPIRATION RATE: 20 BRPM | HEART RATE: 78 BPM | OXYGEN SATURATION: 100 % | DIASTOLIC BLOOD PRESSURE: 54 MMHG | DIASTOLIC BLOOD PRESSURE: 43 MMHG | DIASTOLIC BLOOD PRESSURE: 58 MMHG | RESPIRATION RATE: 14 BRPM | SYSTOLIC BLOOD PRESSURE: 102 MMHG | TEMPERATURE: 98.7 F | RESPIRATION RATE: 29 BRPM | SYSTOLIC BLOOD PRESSURE: 99 MMHG | HEIGHT: 64 IN | DIASTOLIC BLOOD PRESSURE: 61 MMHG | HEART RATE: 75 BPM | SYSTOLIC BLOOD PRESSURE: 95 MMHG | SYSTOLIC BLOOD PRESSURE: 89 MMHG | SYSTOLIC BLOOD PRESSURE: 124 MMHG | RESPIRATION RATE: 26 BRPM | WEIGHT: 145 LBS | DIASTOLIC BLOOD PRESSURE: 65 MMHG | OXYGEN SATURATION: 98 %

## 2018-08-03 ENCOUNTER — OFFICE VISIT (OUTPATIENT)
Dept: INTERNAL MEDICINE | Facility: CLINIC | Age: 28
End: 2018-08-03

## 2018-08-03 ENCOUNTER — TELEPHONE (OUTPATIENT)
Dept: INTERNAL MEDICINE | Facility: CLINIC | Age: 28
End: 2018-08-03

## 2018-08-03 VITALS
BODY MASS INDEX: 24.92 KG/M2 | SYSTOLIC BLOOD PRESSURE: 104 MMHG | WEIGHT: 146 LBS | HEIGHT: 64 IN | DIASTOLIC BLOOD PRESSURE: 70 MMHG

## 2018-08-03 DIAGNOSIS — J45.30 MILD PERSISTENT ASTHMA WITHOUT COMPLICATION: Primary | ICD-10-CM

## 2018-08-03 DIAGNOSIS — G89.29 CHRONIC PAIN OF BOTH KNEES: ICD-10-CM

## 2018-08-03 DIAGNOSIS — F31.12 BIPOLAR AFFECTIVE DISORDER, CURRENTLY MANIC, MODERATE (HCC): ICD-10-CM

## 2018-08-03 DIAGNOSIS — K58.2 IRRITABLE BOWEL SYNDROME WITH BOTH CONSTIPATION AND DIARRHEA: ICD-10-CM

## 2018-08-03 DIAGNOSIS — M25.561 CHRONIC PAIN OF BOTH KNEES: ICD-10-CM

## 2018-08-03 DIAGNOSIS — M25.562 CHRONIC PAIN OF BOTH KNEES: ICD-10-CM

## 2018-08-03 PROBLEM — J20.8 ACUTE BRONCHITIS DUE TO OTHER SPECIFIED ORGANISMS: Status: RESOLVED | Noted: 2018-03-05 | Resolved: 2018-08-03

## 2018-08-03 LAB
ALBUMIN SERPL-MCNC: 4.3 G/DL (ref 3.5–5.2)
ALBUMIN/GLOB SERPL: 2 G/DL
ALP SERPL-CCNC: 50 U/L (ref 39–117)
ALT SERPL-CCNC: 6 U/L (ref 1–33)
AST SERPL-CCNC: 14 U/L (ref 1–32)
BASOPHILS # BLD AUTO: 0.02 10*3/MM3 (ref 0–0.2)
BASOPHILS NFR BLD AUTO: 0.3 % (ref 0–2)
BILIRUB SERPL-MCNC: 0.2 MG/DL (ref 0.1–1.2)
BILIRUB UR QL STRIP: NEGATIVE
BUN SERPL-MCNC: 13 MG/DL (ref 6–20)
BUN/CREAT SERPL: 12.9 (ref 7–25)
CALCIUM SERPL-MCNC: 9.9 MG/DL (ref 8.6–10.5)
CHLORIDE SERPL-SCNC: 103 MMOL/L (ref 98–107)
CLARITY UR: CLEAR
CO2 SERPL-SCNC: 23.8 MMOL/L (ref 22–29)
COLOR UR: YELLOW
CREAT SERPL-MCNC: 1.01 MG/DL (ref 0.57–1)
DEPRECATED RDW RBC AUTO: 41.8 FL (ref 37–54)
EOSINOPHIL # BLD AUTO: 0.27 10*3/MM3 (ref 0–0.7)
EOSINOPHIL NFR BLD AUTO: 4.4 % (ref 0–5)
ERYTHROCYTE [DISTWIDTH] IN BLOOD BY AUTOMATED COUNT: 13.1 % (ref 11.5–15)
GLOBULIN SER CALC-MCNC: 2.2 GM/DL
GLUCOSE SERPL-MCNC: 85 MG/DL (ref 65–99)
GLUCOSE UR STRIP-MCNC: NEGATIVE MG/DL
HCT VFR BLD AUTO: 42 % (ref 34.1–44.9)
HGB BLD-MCNC: 13.6 G/DL (ref 11.2–15.7)
HGB UR QL STRIP.AUTO: NEGATIVE
KETONES UR QL STRIP: NEGATIVE
LEUKOCYTE ESTERASE UR QL STRIP.AUTO: NEGATIVE
LITHIUM SERPL-SCNC: 0.8 MMOL/L (ref 0.6–1.2)
LYMPHOCYTES # BLD AUTO: 2.56 10*3/MM3 (ref 0.8–7)
LYMPHOCYTES NFR BLD AUTO: 41.4 % (ref 10–60)
MCH RBC QN AUTO: 28.8 PG (ref 26–34)
MCHC RBC AUTO-ENTMCNC: 32.4 G/DL (ref 31–37)
MCV RBC AUTO: 88.8 FL (ref 80–100)
MONOCYTES # BLD AUTO: 0.51 10*3/MM3 (ref 0–1)
MONOCYTES NFR BLD AUTO: 8.3 % (ref 0–13)
NEUTROPHILS # BLD AUTO: 2.82 10*3/MM3 (ref 1–11)
NEUTROPHILS NFR BLD AUTO: 45.6 % (ref 30–85)
NITRITE UR QL STRIP: NEGATIVE
PH UR STRIP.AUTO: 7.5 [PH] (ref 5–8)
PLATELET # BLD AUTO: 404 10*3/MM3 (ref 150–450)
PMV BLD AUTO: 10.3 FL (ref 6–12)
POTASSIUM SERPL-SCNC: 5.2 MMOL/L (ref 3.5–5.2)
PROT SERPL-MCNC: 6.5 G/DL (ref 6–8.5)
PROT UR QL STRIP: NEGATIVE
RBC # BLD AUTO: 4.73 10*6/MM3 (ref 3.93–5.22)
SODIUM SERPL-SCNC: 138 MMOL/L (ref 136–145)
SP GR UR STRIP: 1.02 (ref 1–1.03)
TSH SERPL-ACNC: 1.84 MIU/ML (ref 0.27–4.2)
UROBILINOGEN UR QL STRIP: NORMAL
VIT B12 SERPL-MCNC: 442 PG/ML (ref 211–946)
WBC NRBC COR # BLD: 6.18 10*3/MM3 (ref 5–10)

## 2018-08-03 PROCEDURE — 85025 COMPLETE CBC W/AUTO DIFF WBC: CPT | Performed by: INTERNAL MEDICINE

## 2018-08-03 PROCEDURE — 81003 URINALYSIS AUTO W/O SCOPE: CPT | Performed by: INTERNAL MEDICINE

## 2018-08-03 PROCEDURE — 99213 OFFICE O/P EST LOW 20 MIN: CPT | Performed by: INTERNAL MEDICINE

## 2018-08-03 RX ORDER — BUSPIRONE HYDROCHLORIDE 15 MG/1
15 TABLET ORAL 2 TIMES DAILY
COMMUNITY

## 2018-08-03 RX ORDER — LITHIUM CARBONATE 600 MG/1
600 CAPSULE ORAL 2 TIMES DAILY
COMMUNITY

## 2018-08-03 RX ORDER — CETIRIZINE HYDROCHLORIDE 10 MG/1
10 TABLET ORAL DAILY
COMMUNITY

## 2018-08-03 NOTE — PROGRESS NOTES
Called in Clonazepam 0.5 mg to patients [facility] at [address] in [place] [phone] per patient request for patient to take daily as needed quantity of 30 with 5 refills per Dr Pierson.

## 2018-08-03 NOTE — PROGRESS NOTES
Subjective   Violeta Vargas is a 28 y.o. female.     History of Present Illness she is here today for follow-up of IBS, asthma, and bipolar illness.  Unfortunately she is now in between psychiatrists.  She does see a counselor weekly.  Her counselor is attempting to find a new psychiatrist for her.  She remains on lithium 600 mg twice a day.  She underwent colonoscopy recently for persistent constipation.  Her Amitiza was switched to Linzess after a normal colonoscopy.  Fortunately her asthma has not been much of a problem.  Her mood is fairly stable on her present regimen.        Review of Systems   Constitutional: Positive for fatigue. Negative for activity change and appetite change.   HENT: Negative for trouble swallowing.    Respiratory: Negative for cough, chest tightness, shortness of breath and wheezing.    Cardiovascular: Negative for chest pain, palpitations and leg swelling.   Gastrointestinal: Positive for constipation. Negative for abdominal pain, anal bleeding, blood in stool, diarrhea, nausea and vomiting.   Genitourinary: Negative for flank pain.   Neurological: Negative for dizziness, speech difficulty, weakness and headache.   Psychiatric/Behavioral: Negative for depressed mood. The patient is not nervous/anxious.        Objective   Physical Exam   Constitutional: She is oriented to person, place, and time. Vital signs are normal. She appears well-developed and well-nourished. She is active. She does not appear ill.   Eyes: Conjunctivae are normal.   Neck: No thyromegaly present.   Cardiovascular: Normal rate, regular rhythm, S1 normal and S2 normal.  Exam reveals no S3 and no S4.    No murmur heard.  Pulmonary/Chest: No tachypnea. No respiratory distress. She has no decreased breath sounds. She has no wheezes. She has no rhonchi. She has no rales.   Abdominal: Soft. Normal appearance and bowel sounds are normal. She exhibits no abdominal bruit and no mass. There is no hepatosplenomegaly. There is  no tenderness.     Vascular Status -  Her right foot exhibits no edema. Her left foot exhibits no edema.  Neurological: She is alert and oriented to person, place, and time. Gait normal.   Psychiatric: She has a normal mood and affect. Her speech is normal and behavior is normal. Judgment and thought content normal. Cognition and memory are normal.         Assessment/Plan assessment #1 IBS-on new therapy #2 asthma-mild and infrequent problem #3 bipolar illness-as usual her major health problem and at least she is compensated on the present regimen.    Plan #1 no change medication #2 CBC, CMP, urinalysis, TSH, B12 level, lithium level today.  Routine follow-up in 6 months.

## 2018-08-03 NOTE — TELEPHONE ENCOUNTER
Clonazepam has been called into patients [facility] pharmacy earlier today quantity of 30 with 5 refills per Dr Pierson.

## 2018-08-06 ENCOUNTER — AMBULATORY SURGICAL CENTER (AMBULATORY)
Dept: URBAN - METROPOLITAN AREA SURGERY 17 | Facility: SURGERY | Age: 28
End: 2018-08-06

## 2018-08-06 ENCOUNTER — OFFICE (AMBULATORY)
Dept: URBAN - METROPOLITAN AREA PATHOLOGY 4 | Facility: PATHOLOGY | Age: 28
End: 2018-08-06

## 2018-08-06 DIAGNOSIS — R13.10 DYSPHAGIA, UNSPECIFIED: ICD-10-CM

## 2018-08-06 DIAGNOSIS — K59.00 CONSTIPATION, UNSPECIFIED: ICD-10-CM

## 2018-08-06 DIAGNOSIS — K64.8 OTHER HEMORRHOIDS: ICD-10-CM

## 2018-08-06 DIAGNOSIS — K21.0 GASTRO-ESOPHAGEAL REFLUX DISEASE WITH ESOPHAGITIS: ICD-10-CM

## 2018-08-06 DIAGNOSIS — K44.9 DIAPHRAGMATIC HERNIA WITHOUT OBSTRUCTION OR GANGRENE: ICD-10-CM

## 2018-08-06 DIAGNOSIS — K62.5 HEMORRHAGE OF ANUS AND RECTUM: ICD-10-CM

## 2018-08-06 DIAGNOSIS — R19.4 CHANGE IN BOWEL HABIT: ICD-10-CM

## 2018-08-06 DIAGNOSIS — R11.0 NAUSEA: ICD-10-CM

## 2018-08-06 PROBLEM — R47.02 DYSPHASIA: Status: ACTIVE | Noted: 2018-08-06

## 2018-08-06 LAB
GI HISTOLOGY: A. UNSPECIFIED: (no result)
GI HISTOLOGY: PDF REPORT: (no result)

## 2018-08-06 PROCEDURE — 45378 DIAGNOSTIC COLONOSCOPY: CPT

## 2018-08-06 PROCEDURE — 88305 TISSUE EXAM BY PATHOLOGIST: CPT

## 2018-08-06 PROCEDURE — 43450 DILATE ESOPHAGUS 1/MULT PASS: CPT

## 2018-08-06 PROCEDURE — 43239 EGD BIOPSY SINGLE/MULTIPLE: CPT

## 2018-09-07 ENCOUNTER — OFFICE VISIT (OUTPATIENT)
Dept: ORTHOPEDIC SURGERY | Facility: CLINIC | Age: 28
End: 2018-09-07

## 2018-09-07 VITALS — WEIGHT: 149.4 LBS | HEIGHT: 64 IN | BODY MASS INDEX: 25.51 KG/M2

## 2018-09-07 DIAGNOSIS — G89.29 BILATERAL CHRONIC KNEE PAIN: Primary | ICD-10-CM

## 2018-09-07 DIAGNOSIS — M25.561 BILATERAL CHRONIC KNEE PAIN: Primary | ICD-10-CM

## 2018-09-07 DIAGNOSIS — M25.562 BILATERAL CHRONIC KNEE PAIN: Primary | ICD-10-CM

## 2018-09-07 PROCEDURE — 99214 OFFICE O/P EST MOD 30 MIN: CPT | Performed by: ORTHOPAEDIC SURGERY

## 2018-09-07 PROCEDURE — 73562 X-RAY EXAM OF KNEE 3: CPT | Performed by: ORTHOPAEDIC SURGERY

## 2018-09-07 RX ORDER — BUSPIRONE HYDROCHLORIDE 10 MG/1
TABLET ORAL
Refills: 3 | COMMUNITY
Start: 2018-08-04

## 2018-09-07 RX ORDER — QUETIAPINE FUMARATE 50 MG/1
TABLET, FILM COATED ORAL
Refills: 4 | COMMUNITY
Start: 2018-08-04

## 2018-09-07 RX ORDER — LUBIPROSTONE 8 UG/1
CAPSULE, GELATIN COATED ORAL
Refills: 0 | COMMUNITY
Start: 2018-06-15

## 2018-09-07 NOTE — PROGRESS NOTES
"  Patient: Violeta Vargas    YOB: 1990    Medical Record Number: [id number]    Chief Complaints:  Bilateral knee pain    History of Present Illness:     28 y.o. female patient who presents for evaluation of both knees.  She reports a greater than 10 year history of bilateral anterior knee pain.  She describes her pain as moderate, constant and both stabbing and aching.  She says the pain is \"behind my kneecaps\".  She lives on the second floor and has trouble getting up stairs.  Bending, kneeling and squatting all aggravate her pain.  Denies any mechanical catching or locking.  Denies any instability.  Denies any radicular symptoms down the leg.  Of note, she is on lithium.  As result of her lithium therapy.  She tells me that she is unable to take anti-inflammatory medicines.  She has not had any formal treatment for her knees.    Allergies:   Allergies   Allergen Reactions   • Imitrex [Sumatriptan] Anaphylaxis   • Risperidone Unknown (See Comments)     Muscle spasms, throat tightness   • Lamotrigine Rash       Home Medications:    Current Outpatient Prescriptions:   •  acetaminophen (TYLENOL) 500 MG tablet, Take 1,000 mg by mouth Every 6 (Six) Hours As Needed for Mild Pain (1-3)., Disp: , Rfl:   •  ALBUTEROL IN, Inhale As Needed., Disp: , Rfl:   •  busPIRone (BUSPAR) 15 MG tablet, Take 15 mg by mouth 2 (Two) Times a Day., Disp: , Rfl:   •  cetirizine (zyrTEC) 10 MG tablet, Take 10 mg by mouth Daily., Disp: , Rfl:   •  clobetasol (OLUX) 0.05 % topical foam, USE AS DIRECTED TWICE A DAY M-W-F AS NEEDED TO SCALP, Disp: , Rfl: 2  •  clonazePAM (KlonoPIN) 0.5 MG tablet, Take 0.5 mg by mouth., Disp: , Rfl:   •  lithium 600 MG capsule, Take 600 mg by mouth 2 (Two) Times a Day., Disp: , Rfl:   •  norethindrone-ethinyl estradiol (GILDESS 1/20) 1-20 MG-MCG per tablet, Take 1 tablet by mouth Daily., Disp: 21 tablet, Rfl: 12  •  ondansetron (ZOFRAN) 4 MG tablet, TAKE 1 TABLET BY MOUTH EVERY 8 HOURS AS NEEDED FOR " NAUSE/VOMITING, Disp: , Rfl: 4  •  QUEtiapine (SEROquel) 25 MG tablet, Take 25 mg by mouth Every Night., Disp: , Rfl:   •  AMITIZA 8 MCG capsule, TAKE 1 CAPSULE BY MOUTH 2 (TWO) TIMES A DAY WITH MEALS., Disp: , Rfl: 0  •  busPIRone (BUSPAR) 10 MG tablet, TAKE 1 TABLET BY MOUTH 2 (TWO) TIMES A DAY., Disp: , Rfl: 3  •  linaclotide (LINZESS) 145 MCG capsule capsule, Take 145 mcg by mouth Daily., Disp: , Rfl:   •  QUEtiapine (SEROquel) 50 MG tablet, TAKE 1-2 TABLETS BY MOUTH NIGHTLY AS NEEDED, Disp: , Rfl: 4    Past Medical History:   Diagnosis Date   • Anemia    • Anxiety    • Arthritis    • Asthma    • Bipolar 1 disorder (CMS/HCC)    • Cold sore    • Depression    • History of asthma     ALLERGY RELATED - NO INHALERS   • History of iron deficiency anemia    • IBS (irritable bowel syndrome)    • Injury of back    • Migraines        Past Surgical History:   Procedure Laterality Date   • COLONOSCOPY     • DENTAL PROCEDURE     • SHOULDER ARTHROSCOPY W/ LABRAL REPAIR Left 12/2016   • TUBAL ABDOMINAL LIGATION  03/2017   • TUBAL COAGULATION LAPAROSCOPIC N/A 5/26/2017    Procedure: TUBAL COAGULATION LAPAROSCOPIC;  Surgeon: Horacio Echavarria MD;  Location: [facility];  Service:        Social History     Occupational History   • Not on file.     Social History Main Topics   • Smoking status: Never Smoker   • Smokeless tobacco: Never Used   • Alcohol use No      Comment: SOCIAL   • Drug use: No   • Sexual activity: Defer      Social History     Social History Narrative   • No narrative on file       Family History   Problem Relation Age of Onset   • Adopted: Yes   • Bipolar disorder Mother    • Suicide Attempts Mother    • Bipolar disorder Father    • No Known Problems Sister    • No Known Problems Brother    • No Known Problems Son    • No Known Problems Daughter    • No Known Problems Maternal Grandmother    • No Known Problems Maternal Grandfather    • No Known Problems Paternal Grandmother    • No Known Problems  "Paternal Grandfather    • No Known Problems Cousin        Review of Systems:      Constitutional: Denies fever, shaking or chills   Eyes: Denies change in visual acuity   HEENT: Denies nasal congestion or sore throat   Respiratory: Denies cough or shortness of breath   Cardiovascular: Denies chest pain or edema  Endocrine: Denies tremors, palpitations, intolerance of heat or cold, polyuria, polydipsia.  GI: Denies abdominal pain, nausea, vomiting, bloody stools or diarrhea  : Denies frequency, urgency, incontinence, retention, or nocturia.  Musculoskeletal: Denies numbness, tingling or loss of motor function except as above  Integument: Denies rash, lesion or ulceration   Neurologic: Denies headache or focal weakness, deficits  Heme: Denies spontaneous or excessive bleeding, epistaxis, hematuria, melena, fatigue, enlarged or tender lymph nodes.      All other pertinent positives and negatives as noted above in HPI.    Physical Exam: 28 y.o. female  Vitals:    09/07/18 0851   Weight: 67.8 kg (149 lb 6.4 oz)   Height: 162.6 cm (64\")   Respiratory rate: 12  Heart rate: 90    General:  Patient is awake and alert.  Appears in no acute distress or discomfort.    Psych:  Affect and demeanor are appropriate.    Eyes:  Conjunctiva and sclera appear grossly normal.  Eyes track well and EOM seem to be intact.    Ears:  No gross abnormalities.  Hearing adequate for the exam.    Cardiovascular:  Regular rate and rhythm.    Lungs:  Good chest expansion.  Breathing unlabored.    Extremities:  Both knees are examined and her exam is symmetric.  Skin is benign.  No obvious gross abnormalities on inspection including any swelling, masses, atrophy or obvious adenopathy.  No palpable masses or adenopathy.  Moderate to severe tenderness noted over the patellofemoral joint line.  Motion is full.  Negative medial and lateral Ady's.  Positive patellar grind test.  No instability or patellar apprehension.  Strength is well preserved " including hip flexion, knee extension, ankle and toe plantarflexion, ankle inversion and eversion.  Good sensory function throughout the leg and foot.  Palpable pulses distally.  Gait is non-antalgic.    Imaging:  Bilateral standing AP views, bilateral merchants views, and bilateral lateral views of the knees are ordered by myself and reviewed to evaluate the patient's complaint.  No comparison films are immediately available.  The x-rays show no obvious acute abnormalities, lesions, masses, significant degenerative changes, or other concerning findings.    Assessment/Plan:  Bilateral patellofemoral chondromalacia and knee synovitis    We discussed options.  She is a candidate for injections but given her young age, I would recommend that we reserve this as a last option.  I strongly recommend physical therapy.  I have given her a referral for this.  We discussed appropriate activity modifications and restrictions.  She will follow-up as needed.    Justice Cuellar MD    09/07/2018

## 2018-10-31 ENCOUNTER — OFFICE VISIT (OUTPATIENT)
Dept: ORTHOPEDIC SURGERY | Facility: CLINIC | Age: 28
End: 2018-10-31

## 2018-10-31 VITALS — BODY MASS INDEX: 26.93 KG/M2 | TEMPERATURE: 98.5 F | WEIGHT: 152 LBS | HEIGHT: 63 IN

## 2018-10-31 DIAGNOSIS — M22.40 CHONDROMALACIA OF PATELLOFEMORAL JOINT, UNSPECIFIED LATERALITY: Primary | ICD-10-CM

## 2018-10-31 PROCEDURE — 99213 OFFICE O/P EST LOW 20 MIN: CPT | Performed by: ORTHOPAEDIC SURGERY

## 2018-10-31 NOTE — PROGRESS NOTES
"Patient:Violeta Vargas    YOB: 1990    Medical Record Number:[id number]    Chief Complaints:  Bilateral knee pain    History of Present Illness:     28 y.o. female patient who presents for follow-up of both knees.  She continues to have moderate to severe aching throbbing pain in the front of both knees.  The right is much worse than the left.  She states that it periodically \"gives out\" therapy has not helped.  Pain is 7 out of 10.    Allergies:  Allergies   Allergen Reactions   • Imitrex [Sumatriptan] Anaphylaxis   • Risperidone Unknown (See Comments)     Muscle spasms, throat tightness   • Lamotrigine Rash       Home Medications:    Current Outpatient Prescriptions:   •  acetaminophen (TYLENOL) 500 MG tablet, Take 1,000 mg by mouth Every 6 (Six) Hours As Needed for Mild Pain (1-3)., Disp: , Rfl:   •  ALBUTEROL IN, Inhale As Needed., Disp: , Rfl:   •  busPIRone (BUSPAR) 15 MG tablet, Take 15 mg by mouth 2 (Two) Times a Day., Disp: , Rfl:   •  cetirizine (zyrTEC) 10 MG tablet, Take 10 mg by mouth Daily., Disp: , Rfl:   •  clobetasol (OLUX) 0.05 % topical foam, USE AS DIRECTED TWICE A DAY M-W-F AS NEEDED TO SCALP, Disp: , Rfl: 2  •  clonazePAM (KlonoPIN) 0.5 MG tablet, Take 0.5 mg by mouth., Disp: , Rfl:   •  lithium 600 MG capsule, Take 600 mg by mouth 2 (Two) Times a Day., Disp: , Rfl:   •  norethindrone-ethinyl estradiol (GILDESS 1/20) 1-20 MG-MCG per tablet, Take 1 tablet by mouth Daily., Disp: 21 tablet, Rfl: 12  •  ondansetron (ZOFRAN) 4 MG tablet, TAKE 1 TABLET BY MOUTH EVERY 8 HOURS AS NEEDED FOR NAUSE/VOMITING, Disp: , Rfl: 4  •  QUEtiapine (SEROquel) 25 MG tablet, Take 25 mg by mouth Every Night., Disp: , Rfl:   •  QUEtiapine (SEROquel) 50 MG tablet, TAKE 1-2 TABLETS BY MOUTH NIGHTLY AS NEEDED, Disp: , Rfl: 4  •  AMITIZA 8 MCG capsule, TAKE 1 CAPSULE BY MOUTH 2 (TWO) TIMES A DAY WITH MEALS., Disp: , Rfl: 0  •  busPIRone (BUSPAR) 10 MG tablet, TAKE 1 TABLET BY MOUTH 2 (TWO) TIMES A DAY., Disp: " , Rfl: 3  •  linaclotide (LINZESS) 145 MCG capsule capsule, Take 145 mcg by mouth Daily., Disp: , Rfl:     Past Medical History:   Diagnosis Date   • Anemia    • Anxiety    • Arthritis    • Asthma    • Bipolar 1 disorder (CMS/HCC)    • Cold sore    • Depression    • History of asthma     ALLERGY RELATED - NO INHALERS   • History of iron deficiency anemia    • IBS (irritable bowel syndrome)    • Injury of back    • Migraines        Past Surgical History:   Procedure Laterality Date   • COLONOSCOPY     • DENTAL PROCEDURE     • SHOULDER ARTHROSCOPY W/ LABRAL REPAIR Left 12/2016   • TUBAL ABDOMINAL LIGATION  03/2017   • TUBAL COAGULATION LAPAROSCOPIC N/A 5/26/2017    Procedure: TUBAL COAGULATION LAPAROSCOPIC;  Surgeon: Horacio Echavarria MD;  Location: [facility] OR;  Service:        Social History     Occupational History   • Not on file.     Social History Main Topics   • Smoking status: Never Smoker   • Smokeless tobacco: Never Used   • Alcohol use No      Comment: SOCIAL   • Drug use: No   • Sexual activity: Defer      Social History     Social History Narrative   • No narrative on file       Family History   Problem Relation Age of Onset   • Adopted: Yes   • Bipolar disorder Mother    • Suicide Attempts Mother    • Bipolar disorder Father    • No Known Problems Sister    • No Known Problems Brother    • No Known Problems Son    • No Known Problems Daughter    • No Known Problems Maternal Grandmother    • No Known Problems Maternal Grandfather    • No Known Problems Paternal Grandmother    • No Known Problems Paternal Grandfather    • No Known Problems Cousin        Review of Systems:      Constitutional: Denies fever, shaking or chills   Eyes: Denies change in visual acuity   HEENT: Denies nasal congestion or sore throat   Respiratory: Denies cough or shortness of breath   Cardiovascular: Denies chest pain or edema  Endocrine: Denies tremors, palpitations, intolerance of heat or cold, polyuria, polydipsia.  GI:  "Denies abdominal pain, nausea, vomiting, bloody stools or diarrhea  : Denies frequency, urgency, incontinence, retention, or nocturia.  Musculoskeletal: Denies numbness, tingling or loss of motor function except as above  Integument: Denies rash, lesion or ulceration   Neurologic: Denies headache or focal weakness, deficits  Heme: Denies spontaneous or excessive bleeding, epistaxis, hematuria, melena, fatigue, enlarged or tender lymph nodes.      All other pertinent positives and negatives as noted above in HPI.    Physical Exam:28 y.o. female  Vitals:    10/31/18 0816   Temp: 98.5 °F (36.9 °C)   TempSrc: Temporal Artery    Weight: 68.9 kg (152 lb)   Height: 160 cm (63\")       General:  Patient is awake and alert.  Appears in no acute distress or discomfort.    Psych:  Affect and demeanor are appropriate.    Extremities:  Right knee is examined.  Skin is benign.  No effusion.  Moderate lateral patellofemoral tenderness.  I cannot quite correct her patella up to neutral.  No apprehension of the patella.  Moderate discomfort with a patellar grind test.  She has good knee motion.  Negative medial and lateral Ady's test.  Good strength with knee extension.      Imaging:  Previous x-rays of both knees are again reviewed.  No abnormalities noted.    Assessment/Plan:  Bilateral patellofemoral chondromalacia, right currently more symptomatic than left.    We discussed options.  She insists that the knee continues to give out.  I think her symptoms are patellofemoral in nature.  She does have a tight lateral retinaculum.  I told her that a lateral release is a potential option but that this condition is almost always amenable to conservative treatment.  She insists that that has failed.  I going to send her for an MRI to evaluate for intra-articular pathology.  I will call her with the results and we will come up with a plan.    Justice Cuellar MD    10/31/2018    "

## 2018-11-10 ENCOUNTER — HOSPITAL ENCOUNTER (OUTPATIENT)
Dept: MRI IMAGING | Facility: HOSPITAL | Age: 28
Discharge: HOME | End: 2018-11-10
Attending: ORTHOPAEDIC SURGERY | Admitting: ORTHOPAEDIC SURGERY

## 2018-11-10 DIAGNOSIS — M22.40 CHONDROMALACIA OF PATELLOFEMORAL JOINT, UNSPECIFIED LATERALITY: ICD-10-CM

## 2018-11-10 PROCEDURE — 73721 MRI JNT OF LWR EXTRE W/O DYE: CPT

## 2018-11-13 ENCOUNTER — TELEPHONE (OUTPATIENT)
Dept: ORTHOPEDIC SURGERY | Facility: CLINIC | Age: 28
End: 2018-11-13

## 2018-11-14 ENCOUNTER — TELEPHONE (OUTPATIENT)
Dept: ORTHOPEDIC SURGERY | Facility: CLINIC | Age: 28
End: 2018-11-14

## 2018-11-14 DIAGNOSIS — M22.41 CHONDROMALACIA OF RIGHT PATELLOFEMORAL JOINT: Primary | ICD-10-CM

## 2018-11-14 NOTE — TELEPHONE ENCOUNTER
I spoke with her.  I explained that I do not see anything on her MRI that would warrant surgery at this time.  I recommend physical therapy to focus on core and gluteal strengthening.  I have also suggested a topical anti-inflammatory.  I will have [facility] contact her about setting up the therapy.  I will order the topical solution for her. I want to see her back if no better or 4-6 weeks.

## 2018-12-14 RX ORDER — ONDANSETRON 4 MG/1
TABLET, FILM COATED ORAL
Qty: 30 TABLET | Refills: 1 | OUTPATIENT
Start: 2018-12-14

## 2018-12-17 RX ORDER — NORETHINDRONE ACETATE AND ETHINYL ESTRADIOL 1; 20 MG/1; UG/1
TABLET ORAL
Qty: 21 TABLET | Refills: 1 | Status: SHIPPED | OUTPATIENT
Start: 2018-12-17 | End: 2018-12-21 | Stop reason: SDUPTHER

## 2018-12-24 RX ORDER — NORETHINDRONE ACETATE AND ETHINYL ESTRADIOL 1; .02 MG/1; MG/1
1 TABLET ORAL DAILY
Qty: 21 TABLET | Refills: 1 | Status: SHIPPED | OUTPATIENT
Start: 2018-12-24 | End: 2018-12-27 | Stop reason: SDUPTHER

## 2018-12-27 RX ORDER — NORETHINDRONE ACETATE AND ETHINYL ESTRADIOL 1; .02 MG/1; MG/1
1 TABLET ORAL DAILY
Qty: 21 TABLET | Refills: 12 | Status: SHIPPED | OUTPATIENT
Start: 2018-12-27

## 2019-01-17 ENCOUNTER — OFFICE VISIT (OUTPATIENT)
Dept: OBSTETRICS AND GYNECOLOGY | Facility: CLINIC | Age: 29
End: 2019-01-17

## 2019-01-17 ENCOUNTER — PROCEDURE VISIT (OUTPATIENT)
Dept: OBSTETRICS AND GYNECOLOGY | Facility: CLINIC | Age: 29
End: 2019-01-17

## 2019-01-17 VITALS
SYSTOLIC BLOOD PRESSURE: 110 MMHG | BODY MASS INDEX: 26.98 KG/M2 | DIASTOLIC BLOOD PRESSURE: 80 MMHG | WEIGHT: 158 LBS | HEIGHT: 64 IN

## 2019-01-17 DIAGNOSIS — N93.9 ABNORMAL UTERINE BLEEDING (AUB): Primary | ICD-10-CM

## 2019-01-17 DIAGNOSIS — N83.201 CYST OF RIGHT OVARY: ICD-10-CM

## 2019-01-17 DIAGNOSIS — N89.8 VAGINAL ODOR: ICD-10-CM

## 2019-01-17 PROCEDURE — 76830 TRANSVAGINAL US NON-OB: CPT | Performed by: OBSTETRICS & GYNECOLOGY

## 2019-01-17 PROCEDURE — 99213 OFFICE O/P EST LOW 20 MIN: CPT | Performed by: OBSTETRICS & GYNECOLOGY

## 2019-01-17 NOTE — PROGRESS NOTES
HPI   Violeta Vargas  is a 28 y.o. female who presents for evaluation of 2 problems.  First, she has begun to experience heavy, abnormal uterine bleeding.  She has been using oral contraceptive pills for the last year to control menorrhagia.  Initially, this did very well.  Over the last 3-4 months, the patient began to express irregular and unpredictable bleeding.  This can be very heavy with the passage of clots.  The most recent episode ended yesterday.  The patient is uncertain as to its frequency.  She does not have dysmenorrhea, but does have dyspareunia.  She reports that her thyroid is evaluated by her primary care physician and it is normal.  She has a 15 pound weight gain over the last year.  Medicines have changed occasionally, but the patient is uncertain of the frequency of this.  Also, she reports that she has bad vaginal odor which has been present for the last 6 months.  This has neither worsened, nor has it improved.  It is not accompanied by pain.  It is not accompanied by fever or chills.  The patient reports that she is not at risk for STDs.    Chief Complaint   Patient presents with   • Follow-up     irregular bleeding with pain       Past Medical History:   Diagnosis Date   • Anemia    • Anxiety    • Arthritis    • Asthma    • Bipolar 1 disorder (CMS/HCC)    • Cold sore    • Depression    • History of asthma     ALLERGY RELATED - NO INHALERS   • History of iron deficiency anemia    • IBS (irritable bowel syndrome)    • Injury of back    • Migraines        Past Surgical History:   Procedure Laterality Date   • COLONOSCOPY     • DENTAL PROCEDURE     • SHOULDER ARTHROSCOPY W/ LABRAL REPAIR Left 12/2016   • TUBAL ABDOMINAL LIGATION  03/2017   • TUBAL COAGULATION LAPAROSCOPIC N/A 5/26/2017    Procedure: TUBAL COAGULATION LAPAROSCOPIC;  Surgeon: Horacio Echavarria MD;  Location: [facility];  Service:        Social History     Socioeconomic History   • Marital status:      Spouse name: Not  on file   • Number of children: Not on file   • Years of education: Not on file   • Highest education level: Not on file   Social Needs   • Financial resource strain: Not on file   • Food insecurity - worry: Not on file   • Food insecurity - inability: Not on file   • Transportation needs - medical: Not on file   • Transportation needs - non-medical: Not on file   Occupational History   • Not on file   Tobacco Use   • Smoking status: Never Smoker   • Smokeless tobacco: Never Used   Substance and Sexual Activity   • Alcohol use: No     Comment: SOCIAL   • Drug use: No   • Sexual activity: Defer   Other Topics Concern   • Not on file   Social History Narrative   • Not on file       The following portions of the patient's history were reviewed and updated as appropriate: allergies, current medications, past family history, past medical history, past social history, past surgical history and problem list.    Review of Systems this is positive for irregular bleeding.  It is positive for vaginal odor.  It is negative for pelvic pain.  It is negative for fever or chills.  It is positive for a 15 pound weight gain.  All other systems are reviewed and are negative          Physical Exam   Constitutional: She is oriented to person, place, and time. She appears well-developed and well-nourished.   Abdominal: Soft. She exhibits no distension and no mass. There is no tenderness.   Genitourinary:   Genitourinary Comments: Normal female external genitalia  The vagina is estrogenized.  There is a white discharge which clings to the vaginal sidewalls.  Cultures are performed.  Cervical motion tenderness is absent.  The cervix is normal in appearance  The uterus is normal in size.  It is mobile within the pelvis and nontender  No adnexal masses or tenderness are palpable   Neurological: She is alert and oriented to person, place, and time.   Skin: Skin is warm and dry.   Nursing note and vitals reviewed.      Assessment    Violeta  was seen today for follow-up.    Diagnoses and all orders for this visit:    Abnormal uterine bleeding (AUB)  -     US Non-ob Transvaginal    Vaginal odor        Plan  1. Abnormal uterine bleeding.  Physical examination today has been nonfocal.  Counseled.  We will obtain an ultrasound to check the endometrium for polyps as well as the adnexa.  Further workup pending the results of this study.  2. Vaginal odor.  Vaginal discharge is consistent with possible bacterial vaginosis.  Counseled.  Cultures of been performed.  Further treatment pending the results of these studies.    Social History     Tobacco Use   Smoking Status Never Smoker   3.     4.

## 2019-01-19 LAB
A VAGINAE DNA VAG QL NAA+PROBE: NORMAL SCORE
BVAB2 DNA VAG QL NAA+PROBE: NORMAL SCORE
C ALBICANS DNA VAG QL NAA+PROBE: NEGATIVE
C GLABRATA DNA VAG QL NAA+PROBE: NEGATIVE
MEGA1 DNA VAG QL NAA+PROBE: NORMAL SCORE

## 2019-02-05 ENCOUNTER — TELEPHONE (OUTPATIENT)
Dept: OBSTETRICS AND GYNECOLOGY | Facility: CLINIC | Age: 29
End: 2019-02-05

## 2019-02-05 NOTE — TELEPHONE ENCOUNTER
----- Message from Nino Hull MD sent at 2/4/2019  3:09 PM EST -----  I'm not able to reach the patient by phone to review her results.  Please contact her and let her know that her cultures were negative.  Her ultrasound showed a 4 cm simple cyst of the ovary.  I recommend a repeat ultrasound in March.  If the patient would like to discuss options for managing her menstrual hemorrhage, we can see each other in the office sooner and discuss them.  Thank you.

## 2019-02-21 ENCOUNTER — TELEPHONE (OUTPATIENT)
Dept: OBSTETRICS AND GYNECOLOGY | Facility: CLINIC | Age: 29
End: 2019-02-21

## 2021-03-09 VITALS — WEIGHT: 167 LBS | HEIGHT: 64 IN

## 2021-03-09 PROBLEM — K64.8 OTHER HEMORRHOIDS: Status: ACTIVE | Noted: 2018-08-06

## 2021-03-10 ENCOUNTER — TELEHEALTH PROVIDED OTHER THAN IN PATIENT'S HOME (AMBULATORY)
Dept: URBAN - METROPOLITAN AREA TELEHEALTH 6 | Facility: TELEHEALTH | Age: 31
End: 2021-03-10

## 2021-03-10 DIAGNOSIS — K59.00 CONSTIPATION, UNSPECIFIED: ICD-10-CM

## 2021-03-10 DIAGNOSIS — K62.5 HEMORRHAGE OF ANUS AND RECTUM: ICD-10-CM

## 2021-03-10 DIAGNOSIS — K64.8 OTHER HEMORRHOIDS: ICD-10-CM

## 2021-03-10 DIAGNOSIS — K58.9 IRRITABLE BOWEL SYNDROME WITHOUT DIARRHEA: ICD-10-CM

## 2021-03-10 DIAGNOSIS — R19.7 DIARRHEA, UNSPECIFIED: ICD-10-CM

## 2021-03-10 DIAGNOSIS — R10.9 UNSPECIFIED ABDOMINAL PAIN: ICD-10-CM

## 2021-03-10 DIAGNOSIS — R11.0 NAUSEA: ICD-10-CM

## 2021-03-10 PROCEDURE — 99214 OFFICE O/P EST MOD 30 MIN: CPT | Mod: 95 | Performed by: INTERNAL MEDICINE

## 2021-03-10 RX ORDER — HYOSCYAMINE SULFATE EXTENDED-RELEASE 0.38 MG/1
TABLET ORAL
Qty: 60 | Refills: 11 | Status: COMPLETED
Start: 2021-03-10 | End: 2024-05-31

## 2021-04-16 ENCOUNTER — BULK ORDERING (OUTPATIENT)
Dept: CASE MANAGEMENT | Facility: OTHER | Age: 31
End: 2021-04-16

## 2021-04-16 DIAGNOSIS — Z23 IMMUNIZATION DUE: ICD-10-CM

## 2024-05-31 ENCOUNTER — OFFICE (AMBULATORY)
Dept: URBAN - METROPOLITAN AREA CLINIC 64 | Facility: CLINIC | Age: 34
End: 2024-05-31
Payer: COMMERCIAL

## 2024-05-31 VITALS
HEIGHT: 64 IN | HEART RATE: 83 BPM | WEIGHT: 153 LBS | DIASTOLIC BLOOD PRESSURE: 85 MMHG | SYSTOLIC BLOOD PRESSURE: 113 MMHG

## 2024-05-31 DIAGNOSIS — K58.1 IRRITABLE BOWEL SYNDROME WITH CONSTIPATION: ICD-10-CM

## 2024-05-31 DIAGNOSIS — R13.10 DYSPHAGIA, UNSPECIFIED: ICD-10-CM

## 2024-05-31 DIAGNOSIS — K62.5 HEMORRHAGE OF ANUS AND RECTUM: ICD-10-CM

## 2024-05-31 DIAGNOSIS — R10.9 UNSPECIFIED ABDOMINAL PAIN: ICD-10-CM

## 2024-05-31 PROCEDURE — 99204 OFFICE O/P NEW MOD 45 MIN: CPT | Performed by: INTERNAL MEDICINE

## 2024-07-19 ENCOUNTER — ON CAMPUS - OUTPATIENT (AMBULATORY)
Dept: URBAN - METROPOLITAN AREA HOSPITAL 2 | Facility: HOSPITAL | Age: 34
End: 2024-07-19
Payer: COMMERCIAL

## 2024-07-19 VITALS
OXYGEN SATURATION: 98 % | SYSTOLIC BLOOD PRESSURE: 146 MMHG | TEMPERATURE: 98.9 F | HEIGHT: 64 IN | DIASTOLIC BLOOD PRESSURE: 69 MMHG | HEART RATE: 93 BPM | DIASTOLIC BLOOD PRESSURE: 61 MMHG | RESPIRATION RATE: 16 BRPM | RESPIRATION RATE: 22 BRPM | HEART RATE: 94 BPM | HEART RATE: 84 BPM | WEIGHT: 152 LBS | HEART RATE: 92 BPM | RESPIRATION RATE: 18 BRPM | SYSTOLIC BLOOD PRESSURE: 123 MMHG | SYSTOLIC BLOOD PRESSURE: 141 MMHG | SYSTOLIC BLOOD PRESSURE: 104 MMHG | OXYGEN SATURATION: 100 % | RESPIRATION RATE: 12 BRPM | DIASTOLIC BLOOD PRESSURE: 97 MMHG | DIASTOLIC BLOOD PRESSURE: 72 MMHG | SYSTOLIC BLOOD PRESSURE: 116 MMHG | DIASTOLIC BLOOD PRESSURE: 78 MMHG | RESPIRATION RATE: 20 BRPM | RESPIRATION RATE: 15 BRPM | OXYGEN SATURATION: 99 % | HEART RATE: 86 BPM | SYSTOLIC BLOOD PRESSURE: 108 MMHG | SYSTOLIC BLOOD PRESSURE: 113 MMHG | DIASTOLIC BLOOD PRESSURE: 51 MMHG | SYSTOLIC BLOOD PRESSURE: 89 MMHG | SYSTOLIC BLOOD PRESSURE: 88 MMHG | DIASTOLIC BLOOD PRESSURE: 115 MMHG | HEART RATE: 108 BPM | HEART RATE: 91 BPM

## 2024-07-19 DIAGNOSIS — R13.10 DYSPHAGIA, UNSPECIFIED: ICD-10-CM

## 2024-07-19 DIAGNOSIS — K62.5 HEMORRHAGE OF ANUS AND RECTUM: ICD-10-CM

## 2024-07-19 DIAGNOSIS — K44.9 DIAPHRAGMATIC HERNIA WITHOUT OBSTRUCTION OR GANGRENE: ICD-10-CM

## 2024-07-19 DIAGNOSIS — K22.2 ESOPHAGEAL OBSTRUCTION: ICD-10-CM

## 2024-07-19 PROCEDURE — 43450 DILATE ESOPHAGUS 1/MULT PASS: CPT | Performed by: INTERNAL MEDICINE

## 2024-07-19 PROCEDURE — 45378 DIAGNOSTIC COLONOSCOPY: CPT | Performed by: INTERNAL MEDICINE

## 2024-07-19 PROCEDURE — 43235 EGD DIAGNOSTIC BRUSH WASH: CPT | Performed by: INTERNAL MEDICINE

## 2024-07-19 RX ADMIN — ONDANSETRON HYDROCHLORIDE 4 MG: 4 SOLUTION ORAL at 12:44

## (undated) DEVICE — ANTIBACTERIAL UNDYED BRAIDED (POLYGLACTIN 910), SYNTHETIC ABSORBABLE SUTURE: Brand: COATED VICRYL

## (undated) DEVICE — ADHS SKIN DERMABOND TOP ADVANCED

## (undated) DEVICE — ENDOPATH PNEUMONEEDLE INSUFFLATION NEEDLES WITH LUER LOCK CONNECTORS 120MM: Brand: ENDOPATH

## (undated) DEVICE — ENDOPATH XCEL BLADELESS TROCARS WITH STABILITY SLEEVES: Brand: ENDOPATH XCEL

## (undated) DEVICE — LOU GYN LAPAROSCOPY: Brand: MEDLINE INDUSTRIES, INC.

## (undated) DEVICE — PAD SANI MAXI W/ADHS SNG WRP 11IN

## (undated) DEVICE — GLV SURG TRIUMPH CLASSIC PF LTX 7.5 STRL

## (undated) DEVICE — SOL NACL 0.9PCT 1000ML